# Patient Record
Sex: MALE | Race: WHITE | Employment: OTHER | URBAN - METROPOLITAN AREA
[De-identification: names, ages, dates, MRNs, and addresses within clinical notes are randomized per-mention and may not be internally consistent; named-entity substitution may affect disease eponyms.]

---

## 2017-01-17 DIAGNOSIS — D50.8 IRON DEFICIENCY ANEMIA SECONDARY TO INADEQUATE DIETARY IRON INTAKE: ICD-10-CM

## 2017-01-17 LAB
HCT VFR BLD CALC: 37.6 % (ref 40.5–52.5)
HEMOGLOBIN: 12.3 G/DL (ref 13.5–17.5)
MCH RBC QN AUTO: 27.7 PG (ref 26–34)
MCHC RBC AUTO-ENTMCNC: 32.6 G/DL (ref 31–36)
MCV RBC AUTO: 85 FL (ref 80–100)
PDW BLD-RTO: 16.9 % (ref 12.4–15.4)
PLATELET # BLD: 291 K/UL (ref 135–450)
PMV BLD AUTO: 8.9 FL (ref 5–10.5)
RBC # BLD: 4.42 M/UL (ref 4.2–5.9)
WBC # BLD: 7.3 K/UL (ref 4–11)

## 2017-01-25 ENCOUNTER — OFFICE VISIT (OUTPATIENT)
Dept: SURGERY | Age: 72
End: 2017-01-25

## 2017-01-25 VITALS
HEIGHT: 66 IN | WEIGHT: 212 LBS | SYSTOLIC BLOOD PRESSURE: 130 MMHG | BODY MASS INDEX: 34.07 KG/M2 | DIASTOLIC BLOOD PRESSURE: 60 MMHG

## 2017-01-25 DIAGNOSIS — K63.5 COLON POLYP: Primary | ICD-10-CM

## 2017-01-25 PROCEDURE — 99024 POSTOP FOLLOW-UP VISIT: CPT | Performed by: SURGERY

## 2017-01-31 ENCOUNTER — TELEPHONE (OUTPATIENT)
Dept: SURGERY | Age: 72
End: 2017-01-31

## 2017-01-31 ENCOUNTER — HOSPITAL ENCOUNTER (OUTPATIENT)
Dept: CT IMAGING | Age: 72
Discharge: HOME | End: 2017-01-31
Attending: SURGERY | Admitting: SURGERY

## 2017-01-31 DIAGNOSIS — K63.5 COLON POLYP: ICD-10-CM

## 2017-01-31 DIAGNOSIS — K63.5 POLYP OF COLON: ICD-10-CM

## 2017-02-15 ENCOUNTER — OFFICE VISIT (OUTPATIENT)
Dept: FAMILY MEDICINE CLINIC | Age: 72
End: 2017-02-15

## 2017-02-15 VITALS
HEART RATE: 81 BPM | BODY MASS INDEX: 32.87 KG/M2 | RESPIRATION RATE: 16 BRPM | HEIGHT: 66 IN | SYSTOLIC BLOOD PRESSURE: 123 MMHG | OXYGEN SATURATION: 98 % | WEIGHT: 204.5 LBS | TEMPERATURE: 98.2 F | DIASTOLIC BLOOD PRESSURE: 66 MMHG

## 2017-02-15 DIAGNOSIS — E11.22 CONTROLLED TYPE 2 DIABETES MELLITUS WITH STAGE 3 CHRONIC KIDNEY DISEASE, WITHOUT LONG-TERM CURRENT USE OF INSULIN (HCC): ICD-10-CM

## 2017-02-15 DIAGNOSIS — N18.30 CKD (CHRONIC KIDNEY DISEASE) STAGE 3, GFR 30-59 ML/MIN (HCC): ICD-10-CM

## 2017-02-15 DIAGNOSIS — K63.89 MESENTERIC MASS: ICD-10-CM

## 2017-02-15 DIAGNOSIS — E66.09 NON MORBID OBESITY DUE TO EXCESS CALORIES: ICD-10-CM

## 2017-02-15 DIAGNOSIS — R63.4 WEIGHT LOSS, UNINTENTIONAL: ICD-10-CM

## 2017-02-15 DIAGNOSIS — I10 ESSENTIAL HYPERTENSION: Primary | ICD-10-CM

## 2017-02-15 DIAGNOSIS — D50.8 IRON DEFICIENCY ANEMIA SECONDARY TO INADEQUATE DIETARY IRON INTAKE: ICD-10-CM

## 2017-02-15 DIAGNOSIS — N18.30 CONTROLLED TYPE 2 DIABETES MELLITUS WITH STAGE 3 CHRONIC KIDNEY DISEASE, WITHOUT LONG-TERM CURRENT USE OF INSULIN (HCC): ICD-10-CM

## 2017-02-15 DIAGNOSIS — J30.1 SEASONAL ALLERGIC RHINITIS DUE TO POLLEN: ICD-10-CM

## 2017-02-15 PROCEDURE — 99214 OFFICE O/P EST MOD 30 MIN: CPT | Performed by: FAMILY MEDICINE

## 2017-02-15 RX ORDER — M-VIT,TX,IRON,MINS/CALC/FOLIC 27MG-0.4MG
1 TABLET ORAL DAILY
COMMUNITY

## 2017-02-15 ASSESSMENT — ENCOUNTER SYMPTOMS
COLOR CHANGE: 0
NAUSEA: 0
BLOOD IN STOOL: 0
ANAL BLEEDING: 0
SHORTNESS OF BREATH: 0
CONSTIPATION: 0
VOMITING: 0
COUGH: 0
ABDOMINAL PAIN: 0
RECTAL PAIN: 0
STRIDOR: 0
CHEST TIGHTNESS: 0
APNEA: 0
DIARRHEA: 0
WHEEZING: 0
CHOKING: 0
ABDOMINAL DISTENTION: 0

## 2017-02-16 PROBLEM — R19.00 RETROPERITONEAL MASS: Status: ACTIVE | Noted: 2017-02-16

## 2017-02-23 ENCOUNTER — OFFICE VISIT (OUTPATIENT)
Dept: FAMILY MEDICINE CLINIC | Age: 72
End: 2017-02-23

## 2017-02-23 VITALS
OXYGEN SATURATION: 98 % | SYSTOLIC BLOOD PRESSURE: 114 MMHG | DIASTOLIC BLOOD PRESSURE: 67 MMHG | RESPIRATION RATE: 16 BRPM | HEIGHT: 66 IN | WEIGHT: 202 LBS | TEMPERATURE: 98.6 F | BODY MASS INDEX: 32.47 KG/M2 | HEART RATE: 86 BPM

## 2017-02-23 DIAGNOSIS — R19.00 RETROPERITONEAL MASS: ICD-10-CM

## 2017-02-23 DIAGNOSIS — M54.6 ACUTE RIGHT-SIDED THORACIC BACK PAIN: ICD-10-CM

## 2017-02-23 DIAGNOSIS — M54.50 ACUTE RIGHT-SIDED LOW BACK PAIN WITHOUT SCIATICA: Primary | ICD-10-CM

## 2017-02-23 PROCEDURE — 99214 OFFICE O/P EST MOD 30 MIN: CPT | Performed by: FAMILY MEDICINE

## 2017-02-23 RX ORDER — CYCLOBENZAPRINE HCL 5 MG
5 TABLET ORAL EVERY 8 HOURS PRN
Qty: 21 TABLET | Refills: 1 | Status: SHIPPED | OUTPATIENT
Start: 2017-02-23 | End: 2017-09-08 | Stop reason: ALTCHOICE

## 2017-02-23 RX ORDER — NAPROXEN 500 MG/1
500 TABLET ORAL 2 TIMES DAILY WITH MEALS
Qty: 30 TABLET | Refills: 0 | Status: SHIPPED | OUTPATIENT
Start: 2017-02-23 | End: 2017-02-23 | Stop reason: CLARIF

## 2017-02-23 RX ORDER — HYDROCODONE BITARTRATE AND ACETAMINOPHEN 5; 325 MG/1; MG/1
1 TABLET ORAL EVERY 8 HOURS PRN
Qty: 15 TABLET | Refills: 0 | Status: ON HOLD | OUTPATIENT
Start: 2017-02-23 | End: 2017-10-12

## 2017-02-23 ASSESSMENT — ENCOUNTER SYMPTOMS
ANAL BLEEDING: 0
COUGH: 0
NAUSEA: 0
BACK PAIN: 1
APNEA: 0
CONSTIPATION: 0
ABDOMINAL DISTENTION: 0
WHEEZING: 0
CHOKING: 0
RECTAL PAIN: 0
BLOOD IN STOOL: 0
SHORTNESS OF BREATH: 0
ABDOMINAL PAIN: 0
VOMITING: 0
CHEST TIGHTNESS: 0
DIARRHEA: 0
STRIDOR: 0

## 2017-02-27 ENCOUNTER — HOSPITAL ENCOUNTER (OUTPATIENT)
Dept: CT IMAGING | Age: 72
Discharge: HOME | End: 2017-02-27
Attending: INTERNAL MEDICINE | Admitting: INTERNAL MEDICINE

## 2017-02-27 VITALS
WEIGHT: 202 LBS | HEART RATE: 62 BPM | SYSTOLIC BLOOD PRESSURE: 121 MMHG | BODY MASS INDEX: 32.47 KG/M2 | HEIGHT: 66 IN | TEMPERATURE: 97 F | OXYGEN SATURATION: 99 % | DIASTOLIC BLOOD PRESSURE: 73 MMHG | RESPIRATION RATE: 16 BRPM

## 2017-02-27 DIAGNOSIS — R19.00 RETROPERITONEAL MASS: ICD-10-CM

## 2017-02-27 DIAGNOSIS — R19.00 INTRA-ABDOMINAL AND PELVIC SWELLING, MASS AND LUMP, UNSPECIFIED SITE: ICD-10-CM

## 2017-02-27 LAB
APTT: 28.5 SEC (ref 21–31.8)
INR BLD: 1 (ref 0.85–1.15)
PLATELET # BLD: 201 K/UL (ref 135–450)
PROTHROMBIN TIME: 11.3 SEC (ref 9.6–13)

## 2017-02-27 RX ORDER — OXYCODONE HYDROCHLORIDE AND ACETAMINOPHEN 5; 325 MG/1; MG/1
1 TABLET ORAL EVERY 4 HOURS PRN
Status: DISCONTINUED | OUTPATIENT
Start: 2017-02-27 | End: 2017-02-28 | Stop reason: HOSPADM

## 2017-02-27 RX ORDER — FENTANYL CITRATE 50 UG/ML
50 INJECTION, SOLUTION INTRAMUSCULAR; INTRAVENOUS ONCE
Status: COMPLETED | OUTPATIENT
Start: 2017-02-27 | End: 2017-02-27

## 2017-02-27 RX ADMIN — FENTANYL CITRATE 50 MCG: 50 INJECTION, SOLUTION INTRAMUSCULAR; INTRAVENOUS at 09:53

## 2017-02-27 ASSESSMENT — PAIN SCALES - GENERAL
PAINLEVEL_OUTOF10: 0

## 2017-03-07 PROBLEM — C85.83 LARGE CELL LYMPHOMA OF INTRA-ABDOMINAL LYMPH NODES (HCC): Status: ACTIVE | Noted: 2017-03-07

## 2017-03-07 PROBLEM — C85.10 B-CELL LYMPHOMA (HCC): Status: ACTIVE | Noted: 2017-03-07

## 2017-03-13 ENCOUNTER — TELEPHONE (OUTPATIENT)
Dept: SURGERY | Age: 72
End: 2017-03-13

## 2017-03-15 ENCOUNTER — OFFICE VISIT (OUTPATIENT)
Dept: FAMILY MEDICINE CLINIC | Age: 72
End: 2017-03-15

## 2017-03-15 VITALS
RESPIRATION RATE: 16 BRPM | TEMPERATURE: 97.8 F | OXYGEN SATURATION: 98 % | HEART RATE: 84 BPM | HEIGHT: 66 IN | SYSTOLIC BLOOD PRESSURE: 120 MMHG | BODY MASS INDEX: 32.11 KG/M2 | DIASTOLIC BLOOD PRESSURE: 70 MMHG | WEIGHT: 199.8 LBS

## 2017-03-15 DIAGNOSIS — I10 ESSENTIAL HYPERTENSION: Primary | ICD-10-CM

## 2017-03-15 DIAGNOSIS — R19.00 RETROPERITONEAL MASS: ICD-10-CM

## 2017-03-15 DIAGNOSIS — M54.6 ACUTE RIGHT-SIDED THORACIC BACK PAIN: ICD-10-CM

## 2017-03-15 DIAGNOSIS — C85.83 LARGE CELL LYMPHOMA OF INTRA-ABDOMINAL LYMPH NODES (HCC): ICD-10-CM

## 2017-03-15 DIAGNOSIS — N18.30 TYPE 2 DIABETES MELLITUS WITH STAGE 3 CHRONIC KIDNEY DISEASE, WITHOUT LONG-TERM CURRENT USE OF INSULIN (HCC): ICD-10-CM

## 2017-03-15 DIAGNOSIS — E78.5 HYPERLIPIDEMIA WITH TARGET LDL LESS THAN 100: ICD-10-CM

## 2017-03-15 DIAGNOSIS — M54.50 ACUTE RIGHT-SIDED LOW BACK PAIN WITHOUT SCIATICA: ICD-10-CM

## 2017-03-15 DIAGNOSIS — E11.22 TYPE 2 DIABETES MELLITUS WITH STAGE 3 CHRONIC KIDNEY DISEASE, WITHOUT LONG-TERM CURRENT USE OF INSULIN (HCC): ICD-10-CM

## 2017-03-15 DIAGNOSIS — D50.8 IRON DEFICIENCY ANEMIA SECONDARY TO INADEQUATE DIETARY IRON INTAKE: ICD-10-CM

## 2017-03-15 PROCEDURE — 99214 OFFICE O/P EST MOD 30 MIN: CPT | Performed by: FAMILY MEDICINE

## 2017-03-15 RX ORDER — METFORMIN HYDROCHLORIDE 500 MG/1
TABLET, EXTENDED RELEASE ORAL
Qty: 90 TABLET | Refills: 0 | Status: SHIPPED | OUTPATIENT
Start: 2017-03-15 | End: 2017-05-10 | Stop reason: CLARIF

## 2017-03-15 RX ORDER — SIMVASTATIN 40 MG
TABLET ORAL
Qty: 90 TABLET | Refills: 0 | Status: SHIPPED | OUTPATIENT
Start: 2017-03-15 | End: 2017-07-10 | Stop reason: SDUPTHER

## 2017-03-15 RX ORDER — LANOLIN ALCOHOL/MO/W.PET/CERES
325 CREAM (GRAM) TOPICAL
Qty: 90 TABLET | Refills: 1 | Status: SHIPPED | OUTPATIENT
Start: 2017-03-15 | End: 2017-05-10 | Stop reason: CLARIF

## 2017-03-15 ASSESSMENT — ENCOUNTER SYMPTOMS
BACK PAIN: 0
BLOOD IN STOOL: 0
APNEA: 0
VOMITING: 0
CHEST TIGHTNESS: 0
RECTAL PAIN: 0
ABDOMINAL DISTENTION: 0
WHEEZING: 0
STRIDOR: 0
NAUSEA: 0
DIARRHEA: 0
ABDOMINAL PAIN: 0
COUGH: 0
CONSTIPATION: 0
SHORTNESS OF BREATH: 0
ANAL BLEEDING: 0
CHOKING: 0

## 2017-03-20 ENCOUNTER — HOSPITAL ENCOUNTER (OUTPATIENT)
Dept: NON INVASIVE DIAGNOSTICS | Age: 72
Discharge: HOME | End: 2017-03-20
Attending: INTERNAL MEDICINE | Admitting: INTERNAL MEDICINE

## 2017-03-20 DIAGNOSIS — Z01.818 ENCOUNTER FOR OTHER PREPROCEDURAL EXAMINATION: ICD-10-CM

## 2017-03-20 LAB
LV EF: 58 %
LVEF MODALITY: NORMAL

## 2017-03-22 ENCOUNTER — TELEPHONE (OUTPATIENT)
Dept: FAMILY MEDICINE CLINIC | Age: 72
End: 2017-03-22

## 2017-03-22 DIAGNOSIS — I10 ESSENTIAL HYPERTENSION: ICD-10-CM

## 2017-03-22 RX ORDER — AMLODIPINE BESYLATE 5 MG/1
TABLET ORAL
Qty: 90 TABLET | Refills: 0 | Status: ON HOLD | OUTPATIENT
Start: 2017-03-22 | End: 2017-04-02 | Stop reason: HOSPADM

## 2017-03-22 RX ORDER — LISINOPRIL 20 MG/1
TABLET ORAL
Qty: 90 TABLET | Refills: 0 | Status: ON HOLD | OUTPATIENT
Start: 2017-03-22 | End: 2017-04-02

## 2017-03-24 ENCOUNTER — OFFICE VISIT (OUTPATIENT)
Dept: SURGERY | Age: 72
End: 2017-03-24

## 2017-03-24 VITALS
HEIGHT: 66 IN | SYSTOLIC BLOOD PRESSURE: 111 MMHG | OXYGEN SATURATION: 97 % | TEMPERATURE: 98.2 F | BODY MASS INDEX: 31.5 KG/M2 | WEIGHT: 196 LBS | HEART RATE: 88 BPM | DIASTOLIC BLOOD PRESSURE: 68 MMHG

## 2017-03-24 DIAGNOSIS — C85.83 LARGE CELL LYMPHOMA OF INTRA-ABDOMINAL LYMPH NODES (HCC): ICD-10-CM

## 2017-03-24 DIAGNOSIS — K63.89 MESENTERIC MASS: Primary | ICD-10-CM

## 2017-03-24 PROCEDURE — 99203 OFFICE O/P NEW LOW 30 MIN: CPT | Performed by: SURGERY

## 2017-03-27 ENCOUNTER — HOSPITAL ENCOUNTER (OUTPATIENT)
Dept: CARDIAC CATH/INVASIVE PROCEDURES | Age: 72
Discharge: HOME | End: 2017-03-27
Attending: INTERNAL MEDICINE | Admitting: INTERNAL MEDICINE

## 2017-03-27 VITALS — WEIGHT: 197 LBS | TEMPERATURE: 98.2 F | HEIGHT: 64 IN | BODY MASS INDEX: 33.63 KG/M2

## 2017-03-27 DIAGNOSIS — C81.00 NODULAR LYMPHOCYTE PREDOMINANT HODGKIN LYMPHOMA, UNSPECIFIED BODY REGION (HCC): ICD-10-CM

## 2017-03-27 LAB
HCT VFR BLD CALC: 35.5 % (ref 40.5–52.5)
HEMOGLOBIN: 11.8 G/DL (ref 13.5–17.5)
INR BLD: 1 (ref 0.85–1.15)
MCH RBC QN AUTO: 27.3 PG (ref 26–34)
MCHC RBC AUTO-ENTMCNC: 33.3 G/DL (ref 31–36)
MCV RBC AUTO: 82 FL (ref 80–100)
PDW BLD-RTO: 16.5 % (ref 12.4–15.4)
PLATELET # BLD: 259 K/UL (ref 135–450)
PMV BLD AUTO: 8.4 FL (ref 5–10.5)
PROTHROMBIN TIME: 11.3 SEC (ref 9.6–13)
RBC # BLD: 4.33 M/UL (ref 4.2–5.9)
WBC # BLD: 10 K/UL (ref 4–11)

## 2017-03-28 ENCOUNTER — TELEPHONE (OUTPATIENT)
Dept: SURGERY | Age: 72
End: 2017-03-28

## 2017-03-28 PROBLEM — Z51.11 CHEMOTHERAPY MANAGEMENT, ENCOUNTER FOR: Status: ACTIVE | Noted: 2017-03-28

## 2017-03-28 PROBLEM — Z71.2 ENCOUNTER TO DISCUSS TEST RESULTS: Status: ACTIVE | Noted: 2017-03-28

## 2017-03-31 PROBLEM — E86.0 DEHYDRATION: Status: ACTIVE | Noted: 2017-03-31

## 2017-04-03 ENCOUNTER — TELEPHONE (OUTPATIENT)
Dept: FAMILY MEDICINE CLINIC | Age: 72
End: 2017-04-03

## 2017-04-04 ENCOUNTER — CARE COORDINATION (OUTPATIENT)
Dept: CASE MANAGEMENT | Age: 72
End: 2017-04-04

## 2017-04-07 PROBLEM — Z51.89: Status: ACTIVE | Noted: 2017-04-07

## 2017-04-07 PROBLEM — Z29.9 NEED FOR PROPHYLACTIC MEASURE: Status: ACTIVE | Noted: 2017-04-07

## 2017-04-17 ENCOUNTER — OFFICE VISIT (OUTPATIENT)
Dept: FAMILY MEDICINE CLINIC | Age: 72
End: 2017-04-17

## 2017-04-17 VITALS
SYSTOLIC BLOOD PRESSURE: 116 MMHG | TEMPERATURE: 98.5 F | DIASTOLIC BLOOD PRESSURE: 70 MMHG | OXYGEN SATURATION: 98 % | HEART RATE: 81 BPM | RESPIRATION RATE: 16 BRPM | HEIGHT: 66 IN | BODY MASS INDEX: 30.78 KG/M2 | WEIGHT: 191.5 LBS

## 2017-04-17 DIAGNOSIS — E78.5 HYPERLIPIDEMIA WITH TARGET LDL LESS THAN 100: ICD-10-CM

## 2017-04-17 DIAGNOSIS — D50.8 ANEMIA, IRON DEFICIENCY, INADEQUATE DIETARY INTAKE: ICD-10-CM

## 2017-04-17 DIAGNOSIS — E66.09 NON MORBID OBESITY DUE TO EXCESS CALORIES: ICD-10-CM

## 2017-04-17 DIAGNOSIS — I10 ESSENTIAL HYPERTENSION: ICD-10-CM

## 2017-04-17 DIAGNOSIS — N18.30 CKD (CHRONIC KIDNEY DISEASE) STAGE 3, GFR 30-59 ML/MIN (HCC): ICD-10-CM

## 2017-04-17 DIAGNOSIS — E11.22 CONTROLLED TYPE 2 DIABETES MELLITUS WITH STAGE 3 CHRONIC KIDNEY DISEASE, WITHOUT LONG-TERM CURRENT USE OF INSULIN (HCC): Primary | ICD-10-CM

## 2017-04-17 DIAGNOSIS — C85.83 LARGE CELL LYMPHOMA OF INTRA-ABDOMINAL LYMPH NODES (HCC): ICD-10-CM

## 2017-04-17 DIAGNOSIS — N18.30 CONTROLLED TYPE 2 DIABETES MELLITUS WITH STAGE 3 CHRONIC KIDNEY DISEASE, WITHOUT LONG-TERM CURRENT USE OF INSULIN (HCC): Primary | ICD-10-CM

## 2017-04-17 PROCEDURE — 99214 OFFICE O/P EST MOD 30 MIN: CPT | Performed by: FAMILY MEDICINE

## 2017-04-17 ASSESSMENT — ENCOUNTER SYMPTOMS
COUGH: 0
CONSTIPATION: 0
CHEST TIGHTNESS: 0
STRIDOR: 0
NAUSEA: 0
ABDOMINAL PAIN: 0
ABDOMINAL DISTENTION: 0
BLOOD IN STOOL: 0
VOMITING: 0
BACK PAIN: 0
DIARRHEA: 0
WHEEZING: 0
ANAL BLEEDING: 0
CHOKING: 0
SHORTNESS OF BREATH: 0
APNEA: 0
RECTAL PAIN: 0

## 2017-04-29 DIAGNOSIS — E11.22 CONTROLLED TYPE 2 DIABETES MELLITUS WITH STAGE 3 CHRONIC KIDNEY DISEASE, WITHOUT LONG-TERM CURRENT USE OF INSULIN (HCC): ICD-10-CM

## 2017-04-29 DIAGNOSIS — D50.8 IRON DEFICIENCY ANEMIA SECONDARY TO INADEQUATE DIETARY IRON INTAKE: ICD-10-CM

## 2017-04-29 DIAGNOSIS — E55.9 VITAMIN D DEFICIENCY: ICD-10-CM

## 2017-04-29 DIAGNOSIS — I10 ESSENTIAL HYPERTENSION, BENIGN: ICD-10-CM

## 2017-04-29 DIAGNOSIS — N18.30 CHRONIC KIDNEY DISEASE, STAGE III (MODERATE) (HCC): ICD-10-CM

## 2017-04-29 DIAGNOSIS — E78.5 HYPERLIPIDEMIA WITH TARGET LDL LESS THAN 100: ICD-10-CM

## 2017-04-29 DIAGNOSIS — N18.30 CONTROLLED TYPE 2 DIABETES MELLITUS WITH STAGE 3 CHRONIC KIDNEY DISEASE, WITHOUT LONG-TERM CURRENT USE OF INSULIN (HCC): ICD-10-CM

## 2017-04-29 LAB
A/G RATIO: 2 (ref 1.1–2.2)
ALBUMIN SERPL-MCNC: 3.9 G/DL (ref 3.4–5)
ALP BLD-CCNC: 63 U/L (ref 40–129)
ALT SERPL-CCNC: 13 U/L (ref 10–40)
ANION GAP SERPL CALCULATED.3IONS-SCNC: 14 MMOL/L (ref 3–16)
AST SERPL-CCNC: 15 U/L (ref 15–37)
BILIRUB SERPL-MCNC: 0.3 MG/DL (ref 0–1)
BUN BLDV-MCNC: 19 MG/DL (ref 7–20)
CALCIUM SERPL-MCNC: 9.3 MG/DL (ref 8.3–10.6)
CHLORIDE BLD-SCNC: 90 MMOL/L (ref 99–110)
CHOLESTEROL, TOTAL: 119 MG/DL (ref 0–199)
CO2: 34 MMOL/L (ref 21–32)
CREAT SERPL-MCNC: 1.5 MG/DL (ref 0.8–1.3)
CREATININE URINE: 523 MG/DL (ref 39–259)
GFR AFRICAN AMERICAN: 56
GFR NON-AFRICAN AMERICAN: 46
GLOBULIN: 2 G/DL
GLUCOSE BLD-MCNC: 99 MG/DL (ref 70–99)
HCT VFR BLD CALC: 32.1 % (ref 40.5–52.5)
HDLC SERPL-MCNC: 43 MG/DL (ref 40–60)
HEMOGLOBIN: 10.5 G/DL (ref 13.5–17.5)
LDL CHOLESTEROL CALCULATED: 41 MG/DL
MCH RBC QN AUTO: 26.7 PG (ref 26–34)
MCHC RBC AUTO-ENTMCNC: 32.6 G/DL (ref 31–36)
MCV RBC AUTO: 82 FL (ref 80–100)
MICROALBUMIN UR-MCNC: 2 MG/DL
MICROALBUMIN/CREAT UR-RTO: 3.8 MG/G (ref 0–30)
PDW BLD-RTO: 17.4 % (ref 12.4–15.4)
PLATELET # BLD: 533 K/UL (ref 135–450)
PMV BLD AUTO: 7.5 FL (ref 5–10.5)
POTASSIUM SERPL-SCNC: 4.4 MMOL/L (ref 3.5–5.1)
RBC # BLD: 3.92 M/UL (ref 4.2–5.9)
SODIUM BLD-SCNC: 138 MMOL/L (ref 136–145)
TOTAL PROTEIN: 5.9 G/DL (ref 6.4–8.2)
TRIGL SERPL-MCNC: 173 MG/DL (ref 0–150)
VITAMIN D 25-HYDROXY: 49.2 NG/ML
VLDLC SERPL CALC-MCNC: 35 MG/DL
WBC # BLD: 8.7 K/UL (ref 4–11)

## 2017-04-30 LAB
ESTIMATED AVERAGE GLUCOSE: 137 MG/DL
HBA1C MFR BLD: 6.4 %

## 2017-05-02 PROBLEM — R64 CACHEXIA (HCC): Status: ACTIVE | Noted: 2017-05-02

## 2017-05-15 ENCOUNTER — TELEPHONE (OUTPATIENT)
Dept: PHARMACY | Facility: CLINIC | Age: 72
End: 2017-05-15

## 2017-05-22 DIAGNOSIS — E11.9 TYPE 2 DIABETES MELLITUS WITHOUT COMPLICATION (HCC): ICD-10-CM

## 2017-05-22 RX ORDER — LANCETS
EACH MISCELLANEOUS
Qty: 204 EACH | Refills: 3 | Status: SHIPPED | OUTPATIENT
Start: 2017-05-22 | End: 2018-03-19 | Stop reason: CLARIF

## 2017-05-22 RX ORDER — ISOPROPYL ALCOHOL 0.75 G/1
SWAB TOPICAL
Qty: 100 EACH | Refills: 3 | Status: SHIPPED | OUTPATIENT
Start: 2017-05-22 | End: 2018-03-19 | Stop reason: CLARIF

## 2017-05-22 RX ORDER — BLOOD SUGAR DIAGNOSTIC
STRIP MISCELLANEOUS
Qty: 200 STRIP | Refills: 3 | Status: SHIPPED | OUTPATIENT
Start: 2017-05-22 | End: 2018-03-19 | Stop reason: CLARIF

## 2017-06-15 ENCOUNTER — OFFICE VISIT (OUTPATIENT)
Dept: FAMILY MEDICINE CLINIC | Age: 72
End: 2017-06-15

## 2017-06-15 VITALS
SYSTOLIC BLOOD PRESSURE: 111 MMHG | RESPIRATION RATE: 16 BRPM | BODY MASS INDEX: 26.53 KG/M2 | TEMPERATURE: 98.5 F | OXYGEN SATURATION: 98 % | WEIGHT: 165.1 LBS | DIASTOLIC BLOOD PRESSURE: 64 MMHG | HEART RATE: 81 BPM | HEIGHT: 66 IN

## 2017-06-15 DIAGNOSIS — E11.22 CONTROLLED TYPE 2 DIABETES MELLITUS WITH STAGE 3 CHRONIC KIDNEY DISEASE, WITHOUT LONG-TERM CURRENT USE OF INSULIN (HCC): Primary | ICD-10-CM

## 2017-06-15 DIAGNOSIS — N18.30 CKD (CHRONIC KIDNEY DISEASE) STAGE 3, GFR 30-59 ML/MIN (HCC): ICD-10-CM

## 2017-06-15 DIAGNOSIS — I10 ESSENTIAL HYPERTENSION, BENIGN: ICD-10-CM

## 2017-06-15 DIAGNOSIS — D50.8 ANEMIA, IRON DEFICIENCY, INADEQUATE DIETARY INTAKE: ICD-10-CM

## 2017-06-15 DIAGNOSIS — E78.5 HYPERLIPIDEMIA WITH TARGET LDL LESS THAN 100: ICD-10-CM

## 2017-06-15 DIAGNOSIS — C85.83 LARGE CELL LYMPHOMA OF INTRA-ABDOMINAL LYMPH NODES (HCC): ICD-10-CM

## 2017-06-15 DIAGNOSIS — J30.1 SEASONAL ALLERGIC RHINITIS DUE TO POLLEN: ICD-10-CM

## 2017-06-15 DIAGNOSIS — N18.30 CONTROLLED TYPE 2 DIABETES MELLITUS WITH STAGE 3 CHRONIC KIDNEY DISEASE, WITHOUT LONG-TERM CURRENT USE OF INSULIN (HCC): Primary | ICD-10-CM

## 2017-06-15 PROCEDURE — 3288F FALL RISK ASSESSMENT DOCD: CPT | Performed by: FAMILY MEDICINE

## 2017-06-15 PROCEDURE — 99214 OFFICE O/P EST MOD 30 MIN: CPT | Performed by: FAMILY MEDICINE

## 2017-06-15 ASSESSMENT — ENCOUNTER SYMPTOMS
CONSTIPATION: 0
SHORTNESS OF BREATH: 0
ANAL BLEEDING: 0
VOMITING: 0
CHEST TIGHTNESS: 0
DIARRHEA: 0
APNEA: 0
COLOR CHANGE: 0
WHEEZING: 0
STRIDOR: 0
RECTAL PAIN: 0
BLOOD IN STOOL: 0
NAUSEA: 0
ABDOMINAL DISTENTION: 0
ABDOMINAL PAIN: 0
CHOKING: 0
COUGH: 0
BACK PAIN: 0

## 2017-06-21 PROBLEM — I95.2 HYPOTENSION DUE TO DRUGS: Status: ACTIVE | Noted: 2017-06-21

## 2017-06-21 PROBLEM — T45.1X5A CHEMOTHERAPY INDUCED NAUSEA AND VOMITING: Status: ACTIVE | Noted: 2017-06-21

## 2017-06-21 PROBLEM — R11.2 CHEMOTHERAPY INDUCED NAUSEA AND VOMITING: Status: ACTIVE | Noted: 2017-06-21

## 2017-07-07 ENCOUNTER — HOSPITAL ENCOUNTER (OUTPATIENT)
Dept: ONCOLOGY | Age: 72
Discharge: HOME | End: 2017-07-31
Attending: INTERNAL MEDICINE | Admitting: INTERNAL MEDICINE

## 2017-07-07 VITALS
HEART RATE: 61 BPM | TEMPERATURE: 97 F | DIASTOLIC BLOOD PRESSURE: 69 MMHG | RESPIRATION RATE: 16 BRPM | SYSTOLIC BLOOD PRESSURE: 106 MMHG

## 2017-07-07 LAB
ABO/RH: NORMAL
ANTIBODY SCREEN: NORMAL
BLOOD BANK DISPENSE STATUS: NORMAL
BLOOD BANK PRODUCT CODE: NORMAL
BPU ID: NORMAL
DESCRIPTION BLOOD BANK: NORMAL

## 2017-07-07 RX ORDER — HEPARIN SODIUM (PORCINE) LOCK FLUSH IV SOLN 100 UNIT/ML 100 UNIT/ML
500 SOLUTION INTRAVENOUS ONCE
Status: COMPLETED | OUTPATIENT
Start: 2017-07-07 | End: 2017-07-07

## 2017-07-07 RX ORDER — 0.9 % SODIUM CHLORIDE 0.9 %
250 INTRAVENOUS SOLUTION INTRAVENOUS ONCE
Status: COMPLETED | OUTPATIENT
Start: 2017-07-07 | End: 2017-07-07

## 2017-07-07 RX ORDER — DIPHENHYDRAMINE HCL 25 MG
50 TABLET ORAL ONCE
Status: COMPLETED | OUTPATIENT
Start: 2017-07-07 | End: 2017-07-07

## 2017-07-07 RX ORDER — ACETAMINOPHEN 325 MG/1
650 TABLET ORAL ONCE
Status: COMPLETED | OUTPATIENT
Start: 2017-07-07 | End: 2017-07-07

## 2017-07-07 RX ADMIN — HEPARIN SODIUM (PORCINE) LOCK FLUSH IV SOLN 100 UNIT/ML 500 UNITS: 100 SOLUTION at 14:39

## 2017-07-07 RX ADMIN — Medication 250 ML: at 13:10

## 2017-07-07 RX ADMIN — Medication 50 MG: at 12:40

## 2017-07-07 RX ADMIN — ACETAMINOPHEN 650 MG: 325 TABLET ORAL at 12:40

## 2017-07-10 ENCOUNTER — TELEPHONE (OUTPATIENT)
Dept: FAMILY MEDICINE CLINIC | Age: 72
End: 2017-07-10

## 2017-07-10 DIAGNOSIS — E78.5 HYPERLIPIDEMIA WITH TARGET LDL LESS THAN 100: ICD-10-CM

## 2017-07-10 RX ORDER — SIMVASTATIN 40 MG
TABLET ORAL
Qty: 90 TABLET | Refills: 0 | Status: SHIPPED | OUTPATIENT
Start: 2017-07-10 | End: 2017-09-11 | Stop reason: SDUPTHER

## 2017-07-11 PROBLEM — F19.982 DRUG INDUCED INSOMNIA (HCC): Status: ACTIVE | Noted: 2017-07-11

## 2017-07-11 PROBLEM — R53.0 NEOPLASTIC MALIGNANT RELATED FATIGUE: Status: ACTIVE | Noted: 2017-07-11

## 2017-07-17 PROBLEM — D61.818 PANCYTOPENIA (HCC): Status: ACTIVE | Noted: 2017-07-17

## 2017-09-08 ENCOUNTER — HOSPITAL ENCOUNTER (OUTPATIENT)
Dept: ENDOSCOPY | Age: 72
Discharge: HOME | End: 2017-09-08
Attending: INTERNAL MEDICINE | Admitting: INTERNAL MEDICINE

## 2017-09-08 VITALS
RESPIRATION RATE: 16 BRPM | WEIGHT: 150 LBS | BODY MASS INDEX: 24.11 KG/M2 | HEART RATE: 54 BPM | DIASTOLIC BLOOD PRESSURE: 63 MMHG | HEIGHT: 66 IN | OXYGEN SATURATION: 99 % | SYSTOLIC BLOOD PRESSURE: 113 MMHG | TEMPERATURE: 97.4 F

## 2017-09-08 LAB
GLUCOSE BLD-MCNC: 86 MG/DL (ref 70–99)
GLUCOSE BLD-MCNC: 87 MG/DL (ref 70–99)
PERFORMED ON: NORMAL
PERFORMED ON: NORMAL

## 2017-09-08 RX ORDER — HEPARIN SODIUM (PORCINE) LOCK FLUSH IV SOLN 100 UNIT/ML 100 UNIT/ML
500 SOLUTION INTRAVENOUS PRN
Status: DISCONTINUED | OUTPATIENT
Start: 2017-09-08 | End: 2017-09-09 | Stop reason: HOSPADM

## 2017-09-08 RX ADMIN — HEPARIN SODIUM (PORCINE) LOCK FLUSH IV SOLN 100 UNIT/ML 300 UNITS: 100 SOLUTION at 12:22

## 2017-09-08 ASSESSMENT — PAIN - FUNCTIONAL ASSESSMENT: PAIN_FUNCTIONAL_ASSESSMENT: 0-10

## 2017-09-08 ASSESSMENT — PAIN SCALES - GENERAL
PAINLEVEL_OUTOF10: 0
PAINLEVEL_OUTOF10: 0

## 2017-09-11 ENCOUNTER — OFFICE VISIT (OUTPATIENT)
Dept: FAMILY MEDICINE CLINIC | Age: 72
End: 2017-09-11

## 2017-09-11 VITALS
HEIGHT: 66 IN | DIASTOLIC BLOOD PRESSURE: 68 MMHG | TEMPERATURE: 97.7 F | BODY MASS INDEX: 22.87 KG/M2 | RESPIRATION RATE: 16 BRPM | WEIGHT: 142.3 LBS | HEART RATE: 79 BPM | OXYGEN SATURATION: 97 % | SYSTOLIC BLOOD PRESSURE: 105 MMHG

## 2017-09-11 DIAGNOSIS — I10 ESSENTIAL HYPERTENSION, BENIGN: ICD-10-CM

## 2017-09-11 DIAGNOSIS — I10 ESSENTIAL HYPERTENSION: Primary | ICD-10-CM

## 2017-09-11 DIAGNOSIS — R91.1 LUNG NODULE: ICD-10-CM

## 2017-09-11 DIAGNOSIS — Z12.5 SCREENING PSA (PROSTATE SPECIFIC ANTIGEN): ICD-10-CM

## 2017-09-11 DIAGNOSIS — Z23 NEED FOR INFLUENZA VACCINATION: ICD-10-CM

## 2017-09-11 DIAGNOSIS — N18.30 CONTROLLED TYPE 2 DIABETES MELLITUS WITH STAGE 3 CHRONIC KIDNEY DISEASE, WITHOUT LONG-TERM CURRENT USE OF INSULIN (HCC): ICD-10-CM

## 2017-09-11 DIAGNOSIS — N18.30 CKD (CHRONIC KIDNEY DISEASE) STAGE 3, GFR 30-59 ML/MIN (HCC): ICD-10-CM

## 2017-09-11 DIAGNOSIS — E11.22 CONTROLLED TYPE 2 DIABETES MELLITUS WITH STAGE 3 CHRONIC KIDNEY DISEASE, WITHOUT LONG-TERM CURRENT USE OF INSULIN (HCC): ICD-10-CM

## 2017-09-11 DIAGNOSIS — J30.1 SEASONAL ALLERGIC RHINITIS DUE TO POLLEN: ICD-10-CM

## 2017-09-11 DIAGNOSIS — E78.5 HYPERLIPIDEMIA WITH TARGET LDL LESS THAN 100: ICD-10-CM

## 2017-09-11 DIAGNOSIS — D61.818 PANCYTOPENIA (HCC): ICD-10-CM

## 2017-09-11 DIAGNOSIS — Z12.11 COLON CANCER SCREENING: ICD-10-CM

## 2017-09-11 DIAGNOSIS — N18.3 CKD (CHRONIC KIDNEY DISEASE), STAGE 3 (MODERATE): ICD-10-CM

## 2017-09-11 DIAGNOSIS — C85.83 LARGE CELL LYMPHOMA OF INTRA-ABDOMINAL LYMPH NODES (HCC): ICD-10-CM

## 2017-09-11 LAB
A/G RATIO: 2.4 (ref 1.1–2.2)
ALBUMIN SERPL-MCNC: 4 G/DL (ref 3.4–5)
ALP BLD-CCNC: 67 U/L (ref 40–129)
ALT SERPL-CCNC: 22 U/L (ref 10–40)
ANION GAP SERPL CALCULATED.3IONS-SCNC: 12 MMOL/L (ref 3–16)
AST SERPL-CCNC: 32 U/L (ref 15–37)
BILIRUB SERPL-MCNC: 0.4 MG/DL (ref 0–1)
BUN BLDV-MCNC: 19 MG/DL (ref 7–20)
CALCIUM SERPL-MCNC: 9.4 MG/DL (ref 8.3–10.6)
CHLORIDE BLD-SCNC: 94 MMOL/L (ref 99–110)
CHOLESTEROL, TOTAL: 136 MG/DL (ref 0–199)
CO2: 32 MMOL/L (ref 21–32)
CREAT SERPL-MCNC: 1.2 MG/DL (ref 0.8–1.3)
GFR AFRICAN AMERICAN: >60
GFR NON-AFRICAN AMERICAN: 59
GLOBULIN: 1.7 G/DL
GLUCOSE BLD-MCNC: 95 MG/DL (ref 70–99)
HDLC SERPL-MCNC: 72 MG/DL (ref 40–60)
LDL CHOLESTEROL CALCULATED: 40 MG/DL
POTASSIUM SERPL-SCNC: 3.9 MMOL/L (ref 3.5–5.1)
PROSTATE SPECIFIC ANTIGEN: 1.37 NG/ML (ref 0–4)
SODIUM BLD-SCNC: 138 MMOL/L (ref 136–145)
TOTAL PROTEIN: 5.7 G/DL (ref 6.4–8.2)
TRIGL SERPL-MCNC: 119 MG/DL (ref 0–150)
VLDLC SERPL CALC-MCNC: 24 MG/DL

## 2017-09-11 PROCEDURE — G0008 ADMIN INFLUENZA VIRUS VAC: HCPCS | Performed by: FAMILY MEDICINE

## 2017-09-11 PROCEDURE — 90662 IIV NO PRSV INCREASED AG IM: CPT | Performed by: FAMILY MEDICINE

## 2017-09-11 PROCEDURE — 99214 OFFICE O/P EST MOD 30 MIN: CPT | Performed by: FAMILY MEDICINE

## 2017-09-11 RX ORDER — SIMVASTATIN 40 MG
TABLET ORAL
Qty: 90 TABLET | Refills: 0 | Status: SHIPPED | OUTPATIENT
Start: 2017-09-11 | End: 2017-12-05 | Stop reason: SDUPTHER

## 2017-09-11 ASSESSMENT — ENCOUNTER SYMPTOMS
COUGH: 0
RECTAL PAIN: 0
WHEEZING: 0
STRIDOR: 0
DIARRHEA: 0
VOMITING: 0
CHOKING: 0
BLOOD IN STOOL: 0
ABDOMINAL DISTENTION: 0
ABDOMINAL PAIN: 0
ANAL BLEEDING: 0
BACK PAIN: 0
SHORTNESS OF BREATH: 0
CHEST TIGHTNESS: 0
CONSTIPATION: 0
APNEA: 0
NAUSEA: 0

## 2017-09-12 ENCOUNTER — HOSPITAL ENCOUNTER (OUTPATIENT)
Dept: CT IMAGING | Age: 72
Discharge: HOME | End: 2017-09-12
Attending: INTERNAL MEDICINE | Admitting: INTERNAL MEDICINE

## 2017-09-12 DIAGNOSIS — C85.83 LARGE CELL LYMPHOMA OF INTRA-ABDOMINAL LYMPH NODES (HCC): ICD-10-CM

## 2017-09-12 DIAGNOSIS — C85.83 OTHER SPECIFIED TYPES OF NON-HODGKIN LYMPHOMA, INTRA-ABDOMINAL LYMPH NODES (HCC): ICD-10-CM

## 2017-09-12 RX ORDER — HEPARIN SODIUM (PORCINE) LOCK FLUSH IV SOLN 100 UNIT/ML 100 UNIT/ML
500 SOLUTION INTRAVENOUS ONCE
Status: COMPLETED | OUTPATIENT
Start: 2017-09-12 | End: 2017-09-12

## 2017-09-26 ENCOUNTER — OFFICE VISIT (OUTPATIENT)
Dept: SURGERY | Age: 72
End: 2017-09-26

## 2017-09-26 VITALS
WEIGHT: 137 LBS | OXYGEN SATURATION: 100 % | HEART RATE: 69 BPM | SYSTOLIC BLOOD PRESSURE: 82 MMHG | HEIGHT: 66 IN | BODY MASS INDEX: 22.02 KG/M2 | TEMPERATURE: 98.2 F | DIASTOLIC BLOOD PRESSURE: 52 MMHG

## 2017-09-26 DIAGNOSIS — K63.89 MESENTERIC MASS: Primary | ICD-10-CM

## 2017-09-26 DIAGNOSIS — K31.5 DUODENAL STRICTURE: ICD-10-CM

## 2017-09-26 DIAGNOSIS — C85.83 LARGE CELL LYMPHOMA OF INTRA-ABDOMINAL LYMPH NODES (HCC): ICD-10-CM

## 2017-09-26 PROCEDURE — 99215 OFFICE O/P EST HI 40 MIN: CPT | Performed by: SURGERY

## 2017-09-26 RX ORDER — TIZANIDINE 4 MG/1
4 TABLET ORAL 3 TIMES DAILY PRN
COMMUNITY
End: 2018-03-19 | Stop reason: CLARIF

## 2017-09-27 ENCOUNTER — TELEPHONE (OUTPATIENT)
Dept: SURGERY | Age: 72
End: 2017-09-27

## 2017-09-28 ENCOUNTER — TELEPHONE (OUTPATIENT)
Dept: FAMILY MEDICINE CLINIC | Age: 72
End: 2017-09-28

## 2017-10-02 ENCOUNTER — OFFICE VISIT (OUTPATIENT)
Dept: FAMILY MEDICINE CLINIC | Age: 72
End: 2017-10-02

## 2017-10-02 ENCOUNTER — HOSPITAL ENCOUNTER (OUTPATIENT)
Dept: GENERAL RADIOLOGY | Age: 72
Discharge: HOME | End: 2017-10-02
Attending: SURGERY | Admitting: SURGERY

## 2017-10-02 VITALS
RESPIRATION RATE: 16 BRPM | WEIGHT: 137.6 LBS | OXYGEN SATURATION: 98 % | BODY MASS INDEX: 22.11 KG/M2 | DIASTOLIC BLOOD PRESSURE: 63 MMHG | HEART RATE: 73 BPM | HEIGHT: 66 IN | TEMPERATURE: 98.3 F | SYSTOLIC BLOOD PRESSURE: 104 MMHG

## 2017-10-02 DIAGNOSIS — K31.5 DUODENAL STRICTURE: ICD-10-CM

## 2017-10-02 DIAGNOSIS — C85.83 LARGE CELL LYMPHOMA OF INTRA-ABDOMINAL LYMPH NODES (HCC): ICD-10-CM

## 2017-10-02 DIAGNOSIS — D61.818 PANCYTOPENIA (HCC): ICD-10-CM

## 2017-10-02 DIAGNOSIS — I10 ESSENTIAL HYPERTENSION: ICD-10-CM

## 2017-10-02 DIAGNOSIS — E78.5 HYPERLIPIDEMIA WITH TARGET LDL LESS THAN 100: ICD-10-CM

## 2017-10-02 DIAGNOSIS — Z01.818 PREOP EXAMINATION: Primary | ICD-10-CM

## 2017-10-02 DIAGNOSIS — E11.22 CONTROLLED TYPE 2 DIABETES MELLITUS WITH STAGE 3 CHRONIC KIDNEY DISEASE, WITHOUT LONG-TERM CURRENT USE OF INSULIN (HCC): ICD-10-CM

## 2017-10-02 DIAGNOSIS — K63.89 MESENTERIC MASS: ICD-10-CM

## 2017-10-02 DIAGNOSIS — R53.0 NEOPLASTIC MALIGNANT RELATED FATIGUE: ICD-10-CM

## 2017-10-02 DIAGNOSIS — N18.30 CONTROLLED TYPE 2 DIABETES MELLITUS WITH STAGE 3 CHRONIC KIDNEY DISEASE, WITHOUT LONG-TERM CURRENT USE OF INSULIN (HCC): ICD-10-CM

## 2017-10-02 DIAGNOSIS — N18.30 CKD (CHRONIC KIDNEY DISEASE) STAGE 3, GFR 30-59 ML/MIN (HCC): ICD-10-CM

## 2017-10-02 PROCEDURE — 93000 ELECTROCARDIOGRAM COMPLETE: CPT | Performed by: FAMILY MEDICINE

## 2017-10-02 PROCEDURE — 99214 OFFICE O/P EST MOD 30 MIN: CPT | Performed by: FAMILY MEDICINE

## 2017-10-02 ASSESSMENT — ENCOUNTER SYMPTOMS
EYE ITCHING: 0
RECTAL PAIN: 0
SORE THROAT: 0
BACK PAIN: 0
COLOR CHANGE: 0
DIARRHEA: 0
CHEST TIGHTNESS: 0
EYE DISCHARGE: 0
NAUSEA: 0
VOMITING: 0
PHOTOPHOBIA: 0
VOICE CHANGE: 0
SINUS PRESSURE: 0
EYE PAIN: 0
EYE REDNESS: 0
CONSTIPATION: 0
APNEA: 0
ABDOMINAL DISTENTION: 0
COUGH: 0
CHOKING: 0
ABDOMINAL PAIN: 0
STRIDOR: 0
ANAL BLEEDING: 0
SHORTNESS OF BREATH: 0
TROUBLE SWALLOWING: 0
FACIAL SWELLING: 0
RHINORRHEA: 0
WHEEZING: 0
BLOOD IN STOOL: 0

## 2017-10-02 NOTE — PROGRESS NOTES
General anesthesia(GA). Prior GA:yes w/o problems. GA complications:No.  Prior local anesthesia([place]):yes w/o problems  Exercise capacity:Walks regularly. Climbs flight of stairs w/o inappropriate sob. Excellent/good functional capacity:yes. Preop paperwork from surgeon/specialist for completion:Yes. Labs/test requested from surgeon/specialist:no.  1/16/16=Normal nuclear stress test.  Denies cp/sob/dizziness/pnd/palpitations. HTN: doing well. Takes med w/o side effects. Following low salt diet:yes. Adds salt to food at the table:no. No other associated or improving factors. Denies cp/sob/pnd/ankle edema/dizziness. Diabetes:doing well. Taking medication w/o side effects. Preprandial-fasting BS=95-120s. 2hr postprandial MV=398-545m. HBA1c:6.1 on 10/13/14. 5.9 on 4/15/15;5.9 on 7/27/15. 5.8 on 1/15/16. 6.1 on 6/21/16. 6.0 on 10/27/2016.  6.4 on 4/29/17. ACEI/ARB:Yes   Diabetes eye check appt current(within 1year):yes. Improving factor:cdiet & medication. Episodes of hypoglycemia:No  ASA:Yes   LDL<100:yes. 58 on 3/28/16. Was 72 on 10/18/16. No other associated or worsening factors. Denies polyuria/polyphagia/polydipsia/BLE skin lesions/BLE paresthesia. 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 Lilian Telles. CKD3:reports doing well.:per nephro. No other new associated concerns.               Immunization History   Administered Date(s) Administered    Influenza Virus Vaccine 10/01/2012, 09/09/2013    Influenza, High Dose 09/22/2014, 10/17/2016, 09/11/2017    Pneumococcal 13-valent Conjugate ([id number]) 08/04/2015    Pneumococcal Polysaccharide ([id number]) 02/06/2012    Tdap (Boostrix, Adacel) 03/02/2009    Zoster 03/09/2011             Allergies   Allergen Reactions    Ultram [Tramadol] Nausea And Vomiting       Current Outpatient Prescriptions on File Prior to Visit   Medication Sig Dispense Refill    tiZANidine (ZANAFLEX) 4 MG tablet Take 4 mg by mouth 3 times daily as needed      simvastatin (ZOCOR) 40 MG tablet TAKE 1 TABLET NIGHTLY 90 tablet 0    Psyllium (METAMUCIL FIBER PO) Take 1 tablet by mouth 2 times daily      lisinopril (PRINIVIL;ZESTRIL) 20 MG tablet Take 20 mg by mouth daily Indications: Take prn for bp over 120/90      cephALEXin (KEFLEX) 250 MG capsule Take 250 mg by mouth 4 times daily      ACCU-CHEK SMARTVIEW strip TEST ONE TO TWO TIMES DAILY 200 strip 3    ACCU-CHEK FASTCLIX LANCETS MISC TEST ONE TO TWO TIMES DAILY 204 each 3    Alcohol Swabs (B-D SINGLE USE SWABS REGULAR) PADS USE TWICE DAILY 100 each 3    potassium chloride (KLOR-CON M) 20 MEQ extended release tablet Take 1 tablet by mouth daily 60 tablet 3    Ascorbic Acid (VITAMIN C PO) Take 100 mg by mouth daily      loratadine (CLARITIN) 10 MG tablet Take 10 mg by mouth as needed      loperamide (IMODIUM) 2 MG capsule Take 2 mg by mouth as needed for Diarrhea      metFORMIN (GLUCOPHAGE) 500 MG tablet Take 500 mg by mouth daily (with breakfast) IF FSBS OVER 110      ferrous sulfate 325 (65 FE) MG tablet Take 325 mg by mouth 3 times daily (with meals)      ondansetron (ZOFRAN) 8 MG tablet Take 1 tablet by mouth every 8 hours as needed for Nausea or Vomiting 30 tablet 5    fluticasone (FLONASE) 50 MCG/ACT nasal spray 2 sprays by Nasal route daily as needed for Rhinitis      Omega-3 Fatty Acids (FISH OIL) 1000 MG CAPS Take 1,200 mg by mouth 2 times daily       prochlorperazine (COMPAZINE) 10 MG tablet Take 1 tablet by mouth every 6 hours as needed (nausea/vomiting) 120 tablet 3    HYDROcodone-acetaminophen (NORCO) 5-325 MG per tablet Take 1 tablet by mouth every 8 hours as needed for Pain  May cause drowsiness. May impair ability to operate vehicles or machinery. Do not use in combination with alcohol.  Earliest Fill Date: 2/23/17 15 tablet 0    acetaminophen (TYLENOL) 325 MG tablet Take 650 mg by mouth every 6 hours as needed for Pain      Multiple Vitamins-Minerals ROTATOR CUFF REPAIR  2004    TUNNELED VENOUS PORT PLACEMENT  2017    [employer] [address] [place], ref # [id number] Lot [id number], 2018 MR [id number]       Social History   Substance Use Topics    Smoking status: Never Smoker    Smokeless tobacco: Never Used    Alcohol use No     History   Drug Use No       Family History   Problem Relation Age of Onset    Diabetes Mother       age 80    Cancer Mother      ovarian canver    Emphysema Father      worked at [employer]. 90s    Cancer Father      bladder cancer    Diabetes Father     Heart Attack Brother      age 59    Alzheimer's Disease Maternal Grandfather     Seizures Son              Review of Systems   Constitutional: Negative for activity change, appetite change, chills, diaphoresis, fever and unexpected weight change. Negative for:Difficulty sleeping/night sweats/unexplained weight loss or gain/malaise   HENT: Negative for congestion, dental problem, drooling, ear discharge, ear pain, facial swelling, hearing loss, mouth sores, nosebleeds, postnasal drip, rhinorrhea, sinus pressure, sneezing, sore throat, tinnitus, trouble swallowing and voice change. Negative for:Dizziness/vertigo   Eyes: Negative for photophobia, pain, discharge, redness, itching and visual disturbance. Respiratory: Negative for apnea, cough, choking, chest tightness, shortness of breath, wheezing and stridor. Negative for:Hemoptysis/hoarseness/pain on inspiration. Breasts:tenderness/masses/nipple discharge/skin changes. Cardiovascular: Negative for chest pain, palpitations and leg swelling. Negative for:Syncope/presyncope/lower extremity edema/claudication/PND/irregular heart beats   Gastrointestinal: Negative for abdominal distention, abdominal pain, anal bleeding, blood in stool, constipation, diarrhea, nausea, rectal pain and vomiting.         Negative for:Melena/bloating/dysphagia/reflux/loss of appetite   Endocrine: Negative for cold intolerance, heat intolerance, polydipsia, polyphagia and polyuria. Genitourinary: Negative for decreased urine volume, difficulty urinating, discharge, dysuria, enuresis, flank pain, frequency, genital sores, hematuria, penile pain, penile swelling, scrotal swelling, testicular pain and urgency. Musculoskeletal: Negative for arthralgias, back pain, gait problem, joint swelling, myalgias, neck pain and neck stiffness. Skin: Negative for color change, pallor, rash and wound. Neurological: Negative for dizziness, tremors, seizures, syncope, facial asymmetry, speech difficulty, weakness, light-headedness, numbness and headaches. Negative for:Visual disturbance/muscular weakness/paralysis/memory problems. Hematological: Negative for adenopathy. Does not bruise/bleed easily. Negative for:Lymph node tenderness   Psychiatric/Behavioral: Negative for agitation, behavioral problems, confusion, decreased concentration, dysphoric mood, hallucinations, self-injury, sleep disturbance and suicidal ideas. The patient is not nervous/anxious and is not hyperactive. Negative for:Homicidal tendencies(HI)       Objective:   Physical Exam   Constitutional: He is oriented to person, place, and time. Vital signs are normal. He appears well-developed and well-nourished. He is cooperative. He does not have a sickly appearance. No distress. Well hydrated/well appearing. HENT:   Head: Normocephalic and atraumatic. Right Ear: Hearing, tympanic membrane, external ear and ear canal normal.   Left Ear: Hearing, tympanic membrane, external ear and ear canal normal.   Nose: Nose normal.   Mouth/Throat: Uvula is midline, oropharynx is clear and moist and mucous membranes are normal. No oropharyngeal exudate. Hearing intact to nml conversation   Eyes: Conjunctivae, EOM and lids are normal. Pupils are equal, round, and reactive to light.    Neck: Trachea normal, normal range of motion and full passive range of motion without pain. Neck supple. No spinous process tenderness and no muscular tenderness present. No thyroid mass and no thyromegaly present. Cardiovascular: Normal rate, regular rhythm, normal heart sounds, intact distal pulses and normal pulses. No murmur heard. No bilateral leg-ankle edema. Pulmonary/Chest: Effort normal and breath sounds normal. No respiratory distress. CTAB,good AE bilaterally   Abdominal: Soft. Normal appearance and bowel sounds are normal. He exhibits no distension, no abdominal bruit and no mass. There is no splenomegaly or hepatomegaly. There is no tenderness. Musculoskeletal:        Right shoulder: Normal.        Left shoulder: Normal.        Right elbow: Normal.       Left elbow: Normal.        Right wrist: Normal.        Left wrist: Normal.        Right hip: Normal.        Left hip: Normal.        Right knee: Normal.        Left knee: Normal.        Right ankle: Normal.        Left ankle: Normal.        Cervical back: Normal.        Thoracic back: Normal.        Lumbar back: Normal.        Right upper arm: Normal.        Left upper arm: Normal.        Right forearm: Normal.        Left forearm: Normal.        Right hand: Normal.        Left hand: Normal.        Right upper leg: Normal.        Left upper leg: Normal.        Right lower leg: Normal.        Left lower leg: Normal.        Right foot: Normal.        Left foot: Normal.   Lymphadenopathy:        Head (right side): No submental, no submandibular, no tonsillar, no preauricular, no posterior auricular and no occipital adenopathy present. Head (left side): No submental, no submandibular, no tonsillar, no preauricular, no posterior auricular and no occipital adenopathy present. He has no cervical adenopathy. No supraclavicular LAD. Neurological: He is alert and oriented to person, place, and time. He has normal strength and normal reflexes. No cranial nerve deficit or sensory deficit. Nml gait.  Able related fatigue  Per specialist.               Plan:     Pre-op letter/office note has been sent(faxed) to specialist requesting preop consultation. No ASA 1week prior to procedure. Obtain labs/diagnostic tests as discussed today & call back for results within 2-7days. Pt' left office in good condition. Advised to go to local ER or call 911 for any worrisome signs/sx.

## 2017-10-02 NOTE — MR AVS SNAPSHOT
After Visit Summary             Sage Patel   10/2/2017 12:00 PM   Office Visit    Description:  Male : 1945   Provider:  Jigar León MD   Department:  [address] [place] [address]              Your Follow-Up and Future Appointments         Below is a list of your follow-up and future appointments. This may not be a complete list as you may have made appointments directly with providers that we are not aware of or your providers may have made some for you. Please call your providers to confirm appointments. It is important to keep your appointments. Please bring your current insurance card, photo ID, co-pay, and all medication bottles to your appointment. If self-pay, payment is expected at the time of service. Your To-Do List     Future Appointments Provider Department Dept Phone    10/5/2017 12:30 PM Kelley Bolton MD; OR ROOM [address] [place] [address] General Surgery [phone]    2017 8:30 AM Jigar León MD [facility] [phone]    Please arrive 15 minutes prior to appointment, bring photo ID and insurance card.          Information from Your Visit        Department     Name Address Phone Fax    [address] [address]  512 [facility] [phone]      You Were Seen for:         Comments    Preop examination   [[id number]]         Vital Signs     Blood Pressure Pulse Temperature Respirations Height Weight    104/63 (Site: Left Arm, Position: Sitting, Cuff Size: Small Adult) 73 98.3 °F (36.8 °C) (Oral) 16 5' 6\" (1.676 m) 137 lb 9.6 oz (62.4 kg)    Oxygen Saturation Body Mass Index Smoking Status             98% 22.21 kg/m2 Never Smoker            Medications and Orders      Your Current Medications Are              tiZANidine (ZANAFLEX) 4 MG tablet Take 4 mg by mouth 3 times daily as needed    simvastatin (ZOCOR) 40 MG tablet TAKE 1 TABLET NIGHTLY Psyllium (METAMUCIL FIBER PO) Take 1 tablet by mouth 2 times daily    lisinopril (PRINIVIL;ZESTRIL) 20 MG tablet Take 20 mg by mouth daily Indications: Take prn for bp over 120/90    cephALEXin (KEFLEX) 250 MG capsule Take 250 mg by mouth 4 times daily    ACCU-CHEK SMARTVIEW strip TEST ONE TO TWO TIMES DAILY    ACCU-CHEK FASTCLIX LANCETS MISC TEST ONE TO TWO TIMES DAILY    Alcohol Swabs (B-D SINGLE USE SWABS REGULAR) PADS USE TWICE DAILY    potassium chloride (KLOR-CON M) 20 MEQ extended release tablet Take 1 tablet by mouth daily    Ascorbic Acid (VITAMIN C PO) Take 100 mg by mouth daily    loratadine (CLARITIN) 10 MG tablet Take 10 mg by mouth as needed    loperamide (IMODIUM) 2 MG capsule Take 2 mg by mouth as needed for Diarrhea    metFORMIN (GLUCOPHAGE) 500 MG tablet Take 500 mg by mouth daily (with breakfast) IF FSBS OVER 110    ferrous sulfate 325 (65 FE) MG tablet Take 325 mg by mouth 3 times daily (with meals)    ondansetron (ZOFRAN) 8 MG tablet Take 1 tablet by mouth every 8 hours as needed for Nausea or Vomiting    fluticasone (FLONASE) 50 MCG/ACT nasal spray 2 sprays by Nasal route daily as needed for Rhinitis    Omega-3 Fatty Acids (FISH OIL) 1000 MG CAPS Take 1,200 mg by mouth 2 times daily     prochlorperazine (COMPAZINE) 10 MG tablet Take 1 tablet by mouth every 6 hours as needed (nausea/vomiting)    HYDROcodone-acetaminophen (NORCO) 5-325 MG per tablet Take 1 tablet by mouth every 8 hours as needed for Pain  May cause drowsiness. May impair ability to operate vehicles or machinery. Do not use in combination with alcohol. Earliest Fill Date: 2/23/17    acetaminophen (TYLENOL) 325 MG tablet Take 650 mg by mouth every 6 hours as needed for Pain    Multiple Vitamins-Minerals (THERAPEUTIC MULTIVITAMIN-MINERALS) tablet Take 1 tablet by mouth daily    Handicap Placard MISC by Does not apply route. 5 years duration    aspirin 81 MG EC tablet Take 81 mg by mouth daily.       Allergies Routine general medical examination at a health care facility    Erectile dysfunction    Abnormal EKG    Impaired fasting glucose    Vitamin D deficiency    Nodular prostate    Special screening for malignant neoplasm of prostate    Sprain of finger    Contusion of knee    Hypertrophy of prostate without urinary obstruction    Hyperlipidemia    Hypertension    Well adult exam    Allergic rhinitis      Immunizations as of 10/2/2017     Name Date    Influenza Virus Vaccine 9/9/2013, 10/1/2012    Influenza, High Dose 9/11/2017, 10/17/2016, 9/22/2014    Pneumococcal 13-valent Conjugate ([id number]) 8/4/2015    Pneumococcal Polysaccharide ([id number]) 2/6/2012    Tdap (Boostrix, Adacel) 3/2/2009    Zoster 3/9/2011      Preventive Care        Date Due    Diabetic Foot Exam 8/15/2017    Eye Exam By An Eye Doctor 8/15/2017    Hemoglobin A1C (Test For Long-Term Glucose Control) 4/29/2018    Cholesterol Screening 9/11/2018    Tetanus Combination Vaccine (2 - Td) 3/2/2019    Colonoscopy 9/6/2026            [email or web address] Signup           Our records indicate that you have an active [employer] account. You can view your After Visit Summary by going to [email or web address] and logging in with your [employer] username and password. If you don't have a [employer] username and password but a parent or guardian has access to your record, the parent or guardian should login with their own [employer] username and password and access your record to view the After Visit Summary. Additional Information  If you have questions, please contact the physician practice where you receive care. Remember, [employer] is NOT to be used for urgent needs. For medical emergencies, dial 911. For questions regarding your [employer] account call [phone]. If you have a clinical question, please call your doctor's office.

## 2017-10-02 NOTE — PROGRESS NOTES
The [facility] / [facility] ([facility]) [address], [address]    Acknowledgment of Informed Consent for Surgical or Medical Procedure and Sedation  I agree to allow doctor(s) Melvi Concepcion and his/her associates or assistants, including residents and/or other qualified medical practitioner to perform the following medical treatment or procedure and to administer or direct the administration of sedation as necessary:  Procedure(s): ROBOTIC DUODENAL STRICTURE BYPASS POSSIBLE OPEN  My doctor has explained the following regarding the proposed procedure:   the explanation of the procedure   the benefits of the procedure   the potential problems that might occur during recuperation   the risks and side effects of the procedure which could include but are not limited to severe blood loss, infection, stroke or death   the benefits, risks and side effect of alternative procedures including the consequences of declining this procedure or any alternative procedures   the likelihood of achieving satisfactory results. I acknowledge no guarantee or assurance has been made to me regarding the results. I understand that during the course of this treatment/procedure, unforeseen conditions can occur which require an additional or different procedure. I agree to allow my physician or assistants to perform such extension of the original procedure as they may find necessary. I understand that sedation will often result in temporary impairment of memory and fine motor skills and that sedation can occasionally progress to a state of deep sedation or general anesthesia. I understand the risks of anesthesia for surgery include, but are not limited to, sore throat, hoarseness, injury to face, mouth, or teeth; nausea; headache; injury to blood vessels or nerves; death, brain damage, or paralysis.     I understand that if I have a Limitation of Treatment order in effect during my hospitalization, the order may or may not be in effect during this procedure. I give my doctor permission to give me blood or blood products. I understand that there are risks with receiving blood such as hepatitis, AIDS, fever, or allergic reaction. I acknowledge that the risks, benefits, and alternatives of this treatment have been explained to me and that no express or implied warranty has been given by the hospital, any blood bank, or any person or entity as to the blood or blood components transfused. At the discretion of my doctor, I agree to allow observers, equipment/product representatives and allow photographing, and/or televising of the procedure, provided my name or identity is maintained confidentially. I agree the hospital may dispose of or use for scientific or educational purposes any tissue, fluid, or body parts which may be removed.     ________________________________Date________Time______ am/pm  ([place] [facility])  Patient or Signature of Closest Relative or Legal Guardian    ________________________________Date________Time______am/pm      Page 1 of  1  Witness

## 2017-10-03 ENCOUNTER — HOSPITAL ENCOUNTER (OUTPATIENT)
Dept: PREADMISSION TESTING | Age: 72
Discharge: HOME | End: 2017-10-03
Admitting: SURGERY

## 2017-10-03 VITALS — WEIGHT: 137 LBS | BODY MASS INDEX: 22.02 KG/M2 | HEIGHT: 66 IN

## 2017-10-03 RX ORDER — SODIUM CHLORIDE, SODIUM LACTATE, POTASSIUM CHLORIDE, CALCIUM CHLORIDE 600; 310; 30; 20 MG/100ML; MG/100ML; MG/100ML; MG/100ML
INJECTION, SOLUTION INTRAVENOUS CONTINUOUS
Status: CANCELLED | OUTPATIENT
Start: 2017-10-03

## 2017-10-03 RX ORDER — CHLORHEXIDINE GLUCONATE 0.12 MG/ML
15 RINSE ORAL 2 TIMES DAILY
Status: CANCELLED | OUTPATIENT
Start: 2017-10-03

## 2017-10-03 NOTE — PROGRESS NOTES
The following educational items and goals will be achieved upon completion of the patient's Pre-admission testing experience:             Identify the learner who is being assessed for education:  patient                    Ability to Learn:  Exhibits ability to grasp concepts and respond to questions: High  Ready to Learn: Yes  calm   Preferred Method of Learning:  verbal  Barriers to Learning: Verbalizes interest  Special Considerations due to cultural, Synagogue, spiritual beliefs:  No  Language:  English  :  Lanette Bates  [x] Appropriate evaluation / integration of data as delineated by ASPAN Standards of Perianesthesia Nursing Practice    Pain scale and pain management   [x]Patient verbalizes understanding of pain scale and pain management  [x]Pre-operative determination of patients anticipated Post-Operative pain goal:   4 of 10 on 10 point scale post op goal  [] Other     Medication(s) - Compliance with preop medication instructions  [x] Patient verbalizes understanding of preop medications (see [facility] Presurgical Instructions)    Instructions, Pre op                                                                                            [x] Patient verbalizes understanding of presurgical instructions as reviewed with phone interview nurse or in-person nurse review    Fall Risk Potential, Preoperatively                                                                                   [x]No preoperative risk identified  []Preop risk identified:                    []Sensory deficit        []Motor deficit        []Balance problem        []Home medication        []Uses assistive device                    []History of a Fall within the last 30 days    Goal(s) for fall prevention:  []Prevent fall or injury by requesting assistance with activities of daily living  []Patient / Significant other verbalizes understanding the need to call for assistance prior to getting out of bed during hospitalization      Infection Precautions                                                                                            [x] Patient understands implementation of Surgical Site Infection precautions (see [facility] Presurgical Instructions)     Patient Safety  [x] Patient identification verified  [x] Site verified    Instructions - Discharge Planning for Outpatients  [x] Patient / significant other voices understanding of home care and follow up procedures  [x] Encourage patient / significant other to review discharge instructions the day after procedure due to sedation on day of surgery    Anticipated Special Needs upon discharge:        [] Cooling device        [] Crutches       [] Kadyette Eryn        [] Wound Support device        [] Drain        [] Other      Instructions - Discharge Planning for Admitted patients  [x] Patient / significant other understands plan for admission after surgery  [] Patient / significant other understands plan for anticipated discharge dispostion        10/3/2017 10:17 AM Linn Munoz

## 2017-10-06 PROBLEM — K31.5 DUODENAL STRICTURE: Status: ACTIVE | Noted: 2017-10-06
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
## 2017-10-15 ENCOUNTER — CARE COORDINATION (OUTPATIENT)
Dept: CASE MANAGEMENT | Age: 72
End: 2017-10-15

## 2017-10-15 NOTE — CARE COORDINATION
[facility] Transitions Initial Follow Up Call    Call within 2 business days of discharge: Yes    Patient: Yuki Perdue Patient : 1945   MRN: [id number]  Reason for Admission: Duodenal Stricture with Lap Gastrojejunostomy and lysis of adhesions. Discharge Date: 10/14/17 RARS: [facility] Risk Score: 17.5     Care Transition followup call to pt and left message with request for call back. Facility: [facility]  Non-face-to-face services provided:  Obtained and reviewed discharge summary and/or continuity of care documents      Care Transitions 24 Hour Call    Patient DME:  Leslee Gilliam, Amado Fischer Patient DME:  walk in shower, 2 wheeled walker   Do you have support at home?:  Partner/Spouse/SO, Child  Are you an active caregiver in your home?:  Yes  Care Transitions Interventions         Follow Up  Future Appointments  Date Time Provider Eleanor Hopkins   2017 9:15 AM [facility]   2017 8:30 AM Harjinder Rodriguez MD [facility] [address] [facility] Transition Coordinator  [phone]  [email or web address]

## 2017-10-16 ENCOUNTER — TELEPHONE (OUTPATIENT)
Dept: PHARMACY | Facility: CLINIC | Age: 72
End: 2017-10-16

## 2017-10-16 NOTE — TELEPHONE ENCOUNTER
CLINICAL PHARMACY NOTE  Post-Discharge Transitions of Care ([facility])    Patient was discharged from [address] [place] [address] on 10/14/17. Reached patient. Inquired about reviewing his medications, to which he responds \"no, you may not, I don't need to be bothered. \"  Attempted to ask if he had any questions about his medications and he responded \"I just told you, I don't need to be bothered, quit calling me. \" and ended call.      Guru Ricardo, PharmD, [address] [facility]  Direct: [phone]  Department, toll free: [phone], option 7    =======================================================   For Pharmacy Admin Tracking Only    TCM Call Made?: Yes  [facility] ([facility]) Select Patient?: Yes  Outreach Status: Patient Refused  Care Coordinator Outreach to Patient?: Yes  Time Spent (min): 5

## 2017-10-20 ENCOUNTER — TELEPHONE (OUTPATIENT)
Dept: FAMILY MEDICINE CLINIC | Age: 72
End: 2017-10-20

## 2017-10-20 DIAGNOSIS — E11.9 TYPE 2 DIABETES MELLITUS WITHOUT COMPLICATION, UNSPECIFIED LONG TERM INSULIN USE STATUS: ICD-10-CM

## 2017-10-20 NOTE — TELEPHONE ENCOUNTER
Pharmacy is requesting     Aimee Lose  ACCU-CHEK SMARTVIEW strip  ACCU-CHEK FASTCLIX LANCETS [address] [facility]    E-script [phone]  Fax [phone]  Phone [phone]    JOO RONQUILLO

## 2017-10-21 ENCOUNTER — TELEPHONE (OUTPATIENT)
Dept: FAMILY MEDICINE CLINIC | Age: 72
End: 2017-10-21

## 2017-10-21 NOTE — TELEPHONE ENCOUNTER
Patient was wondering if he is due to get his blood checked.     Please advise  Emily Garza [phone] (home) [phone] (work)

## 2017-10-25 DIAGNOSIS — N18.30 CONTROLLED TYPE 2 DIABETES MELLITUS WITH STAGE 3 CHRONIC KIDNEY DISEASE, WITHOUT LONG-TERM CURRENT USE OF INSULIN (HCC): ICD-10-CM

## 2017-10-25 DIAGNOSIS — E78.5 HYPERLIPIDEMIA WITH TARGET LDL LESS THAN 100: ICD-10-CM

## 2017-10-25 DIAGNOSIS — I10 ESSENTIAL HYPERTENSION: ICD-10-CM

## 2017-10-25 DIAGNOSIS — E11.22 CONTROLLED TYPE 2 DIABETES MELLITUS WITH STAGE 3 CHRONIC KIDNEY DISEASE, WITHOUT LONG-TERM CURRENT USE OF INSULIN (HCC): ICD-10-CM

## 2017-10-25 LAB
A/G RATIO: 2.8 (ref 1.1–2.2)
ALBUMIN SERPL-MCNC: 3.3 G/DL (ref 3.4–5)
ALP BLD-CCNC: 64 U/L (ref 40–129)
ALT SERPL-CCNC: 17 U/L (ref 10–40)
ANION GAP SERPL CALCULATED.3IONS-SCNC: 14 MMOL/L (ref 3–16)
AST SERPL-CCNC: 30 U/L (ref 15–37)
BILIRUB SERPL-MCNC: <0.2 MG/DL (ref 0–1)
BUN BLDV-MCNC: 10 MG/DL (ref 7–20)
CALCIUM SERPL-MCNC: 8.8 MG/DL (ref 8.3–10.6)
CHLORIDE BLD-SCNC: 101 MMOL/L (ref 99–110)
CHOLESTEROL, TOTAL: 116 MG/DL (ref 0–199)
CO2: 27 MMOL/L (ref 21–32)
CREAT SERPL-MCNC: 0.8 MG/DL (ref 0.8–1.3)
ESTIMATED AVERAGE GLUCOSE: 91.1 MG/DL
GFR AFRICAN AMERICAN: >60
GFR NON-AFRICAN AMERICAN: >60
GLOBULIN: 1.2 G/DL
GLUCOSE BLD-MCNC: 88 MG/DL (ref 70–99)
HBA1C MFR BLD: 4.8 %
HDLC SERPL-MCNC: 58 MG/DL (ref 40–60)
LDL CHOLESTEROL CALCULATED: 38 MG/DL
POTASSIUM SERPL-SCNC: 4.6 MMOL/L (ref 3.5–5.1)
SODIUM BLD-SCNC: 142 MMOL/L (ref 136–145)
TOTAL PROTEIN: 4.5 G/DL (ref 6.4–8.2)
TRIGL SERPL-MCNC: 100 MG/DL (ref 0–150)
VLDLC SERPL CALC-MCNC: 20 MG/DL

## 2017-10-27 ENCOUNTER — OFFICE VISIT (OUTPATIENT)
Dept: SURGERY | Age: 72
End: 2017-10-27

## 2017-10-27 VITALS
TEMPERATURE: 99.7 F | SYSTOLIC BLOOD PRESSURE: 105 MMHG | WEIGHT: 147 LBS | OXYGEN SATURATION: 97 % | DIASTOLIC BLOOD PRESSURE: 61 MMHG | HEART RATE: 72 BPM | BODY MASS INDEX: 23.73 KG/M2

## 2017-10-27 DIAGNOSIS — K31.5 DUODENAL STRICTURE: ICD-10-CM

## 2017-10-27 DIAGNOSIS — Z98.890 POSTOPERATIVE STATE: Primary | ICD-10-CM

## 2017-10-27 DIAGNOSIS — C85.83 LARGE CELL LYMPHOMA OF INTRA-ABDOMINAL LYMPH NODES (HCC): ICD-10-CM

## 2017-10-27 PROCEDURE — 99024 POSTOP FOLLOW-UP VISIT: CPT | Performed by: SURGERY

## 2017-11-02 ENCOUNTER — OFFICE VISIT (OUTPATIENT)
Dept: FAMILY MEDICINE CLINIC | Age: 72
End: 2017-11-02

## 2017-11-02 VITALS
WEIGHT: 144.3 LBS | TEMPERATURE: 97.9 F | RESPIRATION RATE: 16 BRPM | BODY MASS INDEX: 23.19 KG/M2 | DIASTOLIC BLOOD PRESSURE: 67 MMHG | HEIGHT: 66 IN | HEART RATE: 65 BPM | OXYGEN SATURATION: 98 % | SYSTOLIC BLOOD PRESSURE: 122 MMHG

## 2017-11-02 DIAGNOSIS — C85.83 LARGE CELL LYMPHOMA OF INTRA-ABDOMINAL LYMPH NODES (HCC): ICD-10-CM

## 2017-11-02 DIAGNOSIS — E11.22 CONTROLLED TYPE 2 DIABETES MELLITUS WITH STAGE 3 CHRONIC KIDNEY DISEASE, WITHOUT LONG-TERM CURRENT USE OF INSULIN (HCC): ICD-10-CM

## 2017-11-02 DIAGNOSIS — E78.5 HYPERLIPIDEMIA WITH TARGET LDL LESS THAN 100: ICD-10-CM

## 2017-11-02 DIAGNOSIS — D61.818 PANCYTOPENIA (HCC): ICD-10-CM

## 2017-11-02 DIAGNOSIS — K31.5 DUODENAL STRICTURE: ICD-10-CM

## 2017-11-02 DIAGNOSIS — N18.30 CKD (CHRONIC KIDNEY DISEASE) STAGE 3, GFR 30-59 ML/MIN (HCC): ICD-10-CM

## 2017-11-02 DIAGNOSIS — I10 ESSENTIAL HYPERTENSION, BENIGN: Primary | ICD-10-CM

## 2017-11-02 DIAGNOSIS — N18.30 CONTROLLED TYPE 2 DIABETES MELLITUS WITH STAGE 3 CHRONIC KIDNEY DISEASE, WITHOUT LONG-TERM CURRENT USE OF INSULIN (HCC): ICD-10-CM

## 2017-11-02 PROCEDURE — 1111F DSCHRG MED/CURRENT MED MERGE: CPT | Performed by: FAMILY MEDICINE

## 2017-11-02 PROCEDURE — 3044F HG A1C LEVEL LT 7.0%: CPT | Performed by: FAMILY MEDICINE

## 2017-11-02 PROCEDURE — 99214 OFFICE O/P EST MOD 30 MIN: CPT | Performed by: FAMILY MEDICINE

## 2017-11-02 PROCEDURE — G8427 DOCREV CUR MEDS BY ELIG CLIN: HCPCS | Performed by: FAMILY MEDICINE

## 2017-11-02 PROCEDURE — 1123F ACP DISCUSS/DSCN MKR DOCD: CPT | Performed by: FAMILY MEDICINE

## 2017-11-02 PROCEDURE — 1036F TOBACCO NON-USER: CPT | Performed by: FAMILY MEDICINE

## 2017-11-02 PROCEDURE — 3017F COLORECTAL CA SCREEN DOC REV: CPT | Performed by: FAMILY MEDICINE

## 2017-11-02 PROCEDURE — G8484 FLU IMMUNIZE NO ADMIN: HCPCS | Performed by: FAMILY MEDICINE

## 2017-11-02 PROCEDURE — 4040F PNEUMOC VAC/ADMIN/RCVD: CPT | Performed by: FAMILY MEDICINE

## 2017-11-02 PROCEDURE — G8420 CALC BMI NORM PARAMETERS: HCPCS | Performed by: FAMILY MEDICINE

## 2017-11-02 RX ORDER — POTASSIUM CHLORIDE 20 MEQ/1
20 TABLET, EXTENDED RELEASE ORAL DAILY
Qty: 30 TABLET | Refills: 3 | Status: SHIPPED | OUTPATIENT
Start: 2017-11-02 | End: 2018-02-02 | Stop reason: SDUPTHER

## 2017-11-02 ASSESSMENT — ENCOUNTER SYMPTOMS
APNEA: 0
COLOR CHANGE: 0
ABDOMINAL DISTENTION: 0
SHORTNESS OF BREATH: 0
CHOKING: 0
RECTAL PAIN: 0
ABDOMINAL PAIN: 0
DIARRHEA: 0
BLOOD IN STOOL: 0
ANAL BLEEDING: 0
WHEEZING: 0
CONSTIPATION: 0
NAUSEA: 0
CHEST TIGHTNESS: 0
STRIDOR: 0
VOMITING: 0
COUGH: 0
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
## 2017-12-05 DIAGNOSIS — E78.5 HYPERLIPIDEMIA WITH TARGET LDL LESS THAN 100: ICD-10-CM

## 2017-12-05 RX ORDER — SIMVASTATIN 40 MG
TABLET ORAL
Qty: 90 TABLET | Refills: 0 | Status: SHIPPED | OUTPATIENT
Start: 2017-12-05 | End: 2018-04-17 | Stop reason: SDUPTHER

## 2018-02-02 RX ORDER — POTASSIUM CHLORIDE 20 MEQ/1
TABLET, EXTENDED RELEASE ORAL
Qty: 90 TABLET | Refills: 3 | Status: SHIPPED | OUTPATIENT
Start: 2018-02-02 | End: 2018-06-22 | Stop reason: CLARIF

## 2018-02-05 ENCOUNTER — OFFICE VISIT (OUTPATIENT)
Dept: FAMILY MEDICINE CLINIC | Age: 73
End: 2018-02-05

## 2018-02-05 VITALS
WEIGHT: 151.4 LBS | HEIGHT: 66 IN | OXYGEN SATURATION: 98 % | DIASTOLIC BLOOD PRESSURE: 72 MMHG | HEART RATE: 84 BPM | BODY MASS INDEX: 24.33 KG/M2 | SYSTOLIC BLOOD PRESSURE: 117 MMHG | TEMPERATURE: 98.1 F | RESPIRATION RATE: 16 BRPM

## 2018-02-05 DIAGNOSIS — N18.30 CONTROLLED TYPE 2 DIABETES MELLITUS WITH STAGE 3 CHRONIC KIDNEY DISEASE, WITHOUT LONG-TERM CURRENT USE OF INSULIN (HCC): Primary | ICD-10-CM

## 2018-02-05 DIAGNOSIS — I10 ESSENTIAL HYPERTENSION: ICD-10-CM

## 2018-02-05 DIAGNOSIS — N18.30 CKD (CHRONIC KIDNEY DISEASE) STAGE 3, GFR 30-59 ML/MIN (HCC): ICD-10-CM

## 2018-02-05 DIAGNOSIS — D61.818 PANCYTOPENIA (HCC): ICD-10-CM

## 2018-02-05 DIAGNOSIS — K31.5 DUODENAL STRICTURE: ICD-10-CM

## 2018-02-05 DIAGNOSIS — E78.5 HYPERLIPIDEMIA WITH TARGET LDL LESS THAN 100: ICD-10-CM

## 2018-02-05 DIAGNOSIS — J30.1 CHRONIC SEASONAL ALLERGIC RHINITIS DUE TO POLLEN: ICD-10-CM

## 2018-02-05 DIAGNOSIS — E11.22 CONTROLLED TYPE 2 DIABETES MELLITUS WITH STAGE 3 CHRONIC KIDNEY DISEASE, WITHOUT LONG-TERM CURRENT USE OF INSULIN (HCC): Primary | ICD-10-CM

## 2018-02-05 DIAGNOSIS — C85.83 LARGE CELL LYMPHOMA OF INTRA-ABDOMINAL LYMPH NODES (HCC): ICD-10-CM

## 2018-02-05 PROCEDURE — 1036F TOBACCO NON-USER: CPT | Performed by: FAMILY MEDICINE

## 2018-02-05 PROCEDURE — 99214 OFFICE O/P EST MOD 30 MIN: CPT | Performed by: FAMILY MEDICINE

## 2018-02-05 PROCEDURE — 4040F PNEUMOC VAC/ADMIN/RCVD: CPT | Performed by: FAMILY MEDICINE

## 2018-02-05 PROCEDURE — 1123F ACP DISCUSS/DSCN MKR DOCD: CPT | Performed by: FAMILY MEDICINE

## 2018-02-05 PROCEDURE — G8484 FLU IMMUNIZE NO ADMIN: HCPCS | Performed by: FAMILY MEDICINE

## 2018-02-05 PROCEDURE — 3017F COLORECTAL CA SCREEN DOC REV: CPT | Performed by: FAMILY MEDICINE

## 2018-02-05 PROCEDURE — 3046F HEMOGLOBIN A1C LEVEL >9.0%: CPT | Performed by: FAMILY MEDICINE

## 2018-02-05 PROCEDURE — G8420 CALC BMI NORM PARAMETERS: HCPCS | Performed by: FAMILY MEDICINE

## 2018-02-05 PROCEDURE — G8427 DOCREV CUR MEDS BY ELIG CLIN: HCPCS | Performed by: FAMILY MEDICINE

## 2018-02-05 ASSESSMENT — ENCOUNTER SYMPTOMS
NAUSEA: 0
ANAL BLEEDING: 0
ABDOMINAL DISTENTION: 0
SHORTNESS OF BREATH: 0
RECTAL PAIN: 0
BLOOD IN STOOL: 0
COUGH: 0
WHEEZING: 0
DIARRHEA: 0
ABDOMINAL PAIN: 0
CHEST TIGHTNESS: 0
CONSTIPATION: 0
VOMITING: 0
CHOKING: 0
APNEA: 0
STRIDOR: 0

## 2018-02-05 ASSESSMENT — PATIENT HEALTH QUESTIONNAIRE - PHQ9
SUM OF ALL RESPONSES TO PHQ9 QUESTIONS 1 & 2: 0
SUM OF ALL RESPONSES TO PHQ QUESTIONS 1-9: 0
1. LITTLE INTEREST OR PLEASURE IN DOING THINGS: 0
2. FEELING DOWN, DEPRESSED OR HOPELESS: 0

## 2018-02-05 NOTE — PROGRESS NOTES
[place]. Assessment:     1. Controlled type 2 diabetes mellitus with stage 3 chronic kidney disease, without long-term current use of insulin ([address]  VSS/well appearing. Stable. A1c due 4/2018. Diabetes:Check feet daily. Yrly eye checks advised. Education: Reviewed ABCs of diabetes management (respective goals in parentheses):  A1C (<7), blood pressure (<130/80), and cholesterol (LDL <100). Counseled at this visit on the following: diabetes complication prevention, foot care. 2. Essential hypertension  Stable. 3. 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 4. Chronic seasonal allergic rhinitis due to pollen  Stable. 5. Hyperlipidemia with target LDL less than 100  Stable. Labs due 4/2018. 6. CKD (chronic kidney disease) stage 3, GFR 30-59 ml/min  Stable. Per nephro. 7. Pancytopenia ([address]  Per specialists. 8. Duodenal stricture  S/p tx:continue care plan per specialist.             Plan:         Pt' left office in good condition. Obtain labs/diagnostic tests as discussed today & call back for results within 2-7days.   Advised to go to local ER or call 911 for

## 2018-03-01 ENCOUNTER — OFFICE VISIT (OUTPATIENT)
Dept: FAMILY MEDICINE CLINIC | Age: 73
End: 2018-03-01

## 2018-03-01 VITALS
BODY MASS INDEX: 24.77 KG/M2 | OXYGEN SATURATION: 98 % | SYSTOLIC BLOOD PRESSURE: 118 MMHG | TEMPERATURE: 98.2 F | HEART RATE: 81 BPM | DIASTOLIC BLOOD PRESSURE: 74 MMHG | RESPIRATION RATE: 16 BRPM | WEIGHT: 154.1 LBS | HEIGHT: 66 IN

## 2018-03-01 DIAGNOSIS — M25.511 ACUTE PAIN OF RIGHT SHOULDER: Primary | ICD-10-CM

## 2018-03-01 PROCEDURE — G8484 FLU IMMUNIZE NO ADMIN: HCPCS | Performed by: FAMILY MEDICINE

## 2018-03-01 PROCEDURE — 3017F COLORECTAL CA SCREEN DOC REV: CPT | Performed by: FAMILY MEDICINE

## 2018-03-01 PROCEDURE — 1123F ACP DISCUSS/DSCN MKR DOCD: CPT | Performed by: FAMILY MEDICINE

## 2018-03-01 PROCEDURE — G8420 CALC BMI NORM PARAMETERS: HCPCS | Performed by: FAMILY MEDICINE

## 2018-03-01 PROCEDURE — 99214 OFFICE O/P EST MOD 30 MIN: CPT | Performed by: FAMILY MEDICINE

## 2018-03-01 PROCEDURE — 4040F PNEUMOC VAC/ADMIN/RCVD: CPT | Performed by: FAMILY MEDICINE

## 2018-03-01 PROCEDURE — G8427 DOCREV CUR MEDS BY ELIG CLIN: HCPCS | Performed by: FAMILY MEDICINE

## 2018-03-01 PROCEDURE — 1036F TOBACCO NON-USER: CPT | Performed by: FAMILY MEDICINE

## 2018-03-01 ASSESSMENT — ENCOUNTER SYMPTOMS
VOMITING: 0
DIARRHEA: 0
WHEEZING: 0
STRIDOR: 0
CHOKING: 0
SHORTNESS OF BREATH: 0
ABDOMINAL DISTENTION: 0
APNEA: 0
BLOOD IN STOOL: 0
ANAL BLEEDING: 0
NAUSEA: 0
BACK PAIN: 0
ABDOMINAL PAIN: 0
CHEST TIGHTNESS: 0
CONSTIPATION: 0
COUGH: 0

## 2018-03-01 NOTE — PROGRESS NOTES
diaphoresis, fatigue, fever and unexpected weight change. Respiratory: Negative for apnea, cough, choking, chest tightness, shortness of breath, wheezing and stridor. Cardiovascular: Negative for chest pain, palpitations and leg swelling. Gastrointestinal: Negative for abdominal distention, abdominal pain, anal bleeding, blood in stool, constipation, diarrhea, nausea and vomiting. Musculoskeletal: Positive for arthralgias. Negative for back pain, gait problem, joint swelling, neck pain and neck stiffness. Skin: Negative for rash and wound. Neurological: Negative for dizziness, weakness, light-headedness, numbness and headaches. Hematological: Negative for adenopathy. Objective:   Physical Exam   Constitutional: He is oriented to person, place, and time. Vital signs are normal. He appears well-developed and well-nourished. He is cooperative. He does not have a sickly appearance. No distress. HENT:   Nose: Nose normal.   Mouth/Throat: Uvula is midline, oropharynx is clear and moist and mucous membranes are normal.   Hearing intact to nml conversation   Eyes: Conjunctivae, EOM and lids are normal. Pupils are equal, round, and reactive to light. Neck: Trachea normal and normal range of motion. Neck supple. Cardiovascular: Normal rate, regular rhythm, normal heart sounds, intact distal pulses and normal pulses. Pulmonary/Chest: Effort normal and breath sounds normal.   CTAB,good AE bilaterally   Abdominal: Soft. Normal appearance and bowel sounds are normal. He exhibits no distension and no mass. There is no tenderness. Musculoskeletal:        Right shoulder: He exhibits tenderness. He exhibits normal range of motion, no bony tenderness, no swelling, no effusion, no crepitus, no deformity, no laceration, no spasm, normal pulse and normal strength.         Left shoulder: Normal.        Right elbow: Normal.       Left elbow: Normal.        Right wrist: Normal.        Left wrist: Normal. Cervical back: Normal.        Right upper arm: Normal.        Left upper arm: Normal.        Right forearm: Normal.        Left forearm: Normal.        Arms:       Right hand: Normal.        Left hand: Normal.   BUE:nml neurosensory-motor-vascular exam.     Lymphadenopathy:     He has no cervical adenopathy. Neurological: He is alert and oriented to person, place, and time. Skin: Skin is warm, dry and intact. No rash noted. He is not diaphoretic. No cyanosis. No pallor. Good skin turgor. Capillary refill=2-3 secs. Psychiatric: He has a normal mood and affect. Assessment:      1. Acute pain of right shoulder  VSS/well appearing. Likely shoulder muscle sprain. Rest/otc tylenol(max daily=3g)/ice-heat pad/shoulder stretch regime per AVS printout. Xray eval if not improving. XR SHOULDER RIGHT (MIN 2 VIEWS)           Plan:      Call or return to clinic prn if these symptoms worsen or fail to improve as anticipated. Pt' left office in good condition. Advised to go to local ER or call 911 for any worrisome signs/sx including but not limited to worsening of current complaint.

## 2018-03-14 ENCOUNTER — HOSPITAL ENCOUNTER (OUTPATIENT)
Dept: OTHER | Age: 73
Discharge: HOME | End: 2018-03-14
Attending: FAMILY MEDICINE | Admitting: FAMILY MEDICINE

## 2018-03-14 ENCOUNTER — TELEPHONE (OUTPATIENT)
Dept: FAMILY MEDICINE CLINIC | Age: 73
End: 2018-03-14

## 2018-03-14 DIAGNOSIS — M25.511 ACUTE PAIN OF RIGHT SHOULDER: ICD-10-CM

## 2018-03-14 DIAGNOSIS — R93.89 ABNORMAL CXR: Primary | ICD-10-CM

## 2018-03-14 DIAGNOSIS — C85.83 LARGE CELL LYMPHOMA OF INTRA-ABDOMINAL LYMPH NODES (HCC): ICD-10-CM

## 2018-03-14 NOTE — TELEPHONE ENCOUNTER
Notify pt':  Right shoulder xray is abnml:it shows arthritis changes but also right lung abnormality. CT chest is recommended. Order placed:pls assist pt' to schedule within 1-2days. Interim, if he develops sob/severe cough/fever/chills then go to local ER.  f/u with me within 1week.

## 2018-03-15 ENCOUNTER — TELEPHONE (OUTPATIENT)
Dept: FAMILY MEDICINE CLINIC | Age: 73
End: 2018-03-15

## 2018-03-15 ENCOUNTER — HOSPITAL ENCOUNTER (OUTPATIENT)
Dept: CT IMAGING | Age: 73
Discharge: HOME | End: 2018-03-15
Attending: FAMILY MEDICINE | Admitting: FAMILY MEDICINE

## 2018-03-15 DIAGNOSIS — R93.89 ABNORMAL CXR: ICD-10-CM

## 2018-03-15 DIAGNOSIS — C85.83 LARGE CELL LYMPHOMA OF INTRA-ABDOMINAL LYMPH NODES (HCC): ICD-10-CM

## 2018-03-15 DIAGNOSIS — R93.89 ABNORMAL FINDINGS ON DIAGNOSTIC IMAGING OF OTHER SPECIFIED BODY STRUCTURES: ICD-10-CM

## 2018-03-15 NOTE — TELEPHONE ENCOUNTER
Spoke with [facility] from Radiology Dept to inform them to send pt to ER dept. I spoke with Charge RN, Piero. I informed that pt was coming to ER from Radiology regarding abnormal CT.   JENSEN

## 2018-03-19 ENCOUNTER — TELEPHONE (OUTPATIENT)
Dept: PULMONOLOGY | Age: 73
End: 2018-03-19

## 2018-03-19 ENCOUNTER — HOSPITAL ENCOUNTER (OUTPATIENT)
Dept: OTHER | Age: 73
Discharge: HOME | End: 2018-03-19
Attending: INTERNAL MEDICINE | Admitting: INTERNAL MEDICINE

## 2018-03-19 DIAGNOSIS — J90 PLEURAL EFFUSION: ICD-10-CM

## 2018-03-19 DIAGNOSIS — J90 PLEURAL EFFUSION: Primary | ICD-10-CM

## 2018-03-19 PROBLEM — M54.50 ACUTE RIGHT-SIDED LOW BACK PAIN WITHOUT SCIATICA: Status: RESOLVED | Noted: 2017-02-23 | Resolved: 2018-03-19

## 2018-03-19 PROBLEM — J94.8 HYDROPNEUMOTHORAX: Status: ACTIVE | Noted: 2018-03-19

## 2018-03-19 PROBLEM — M54.6 ACUTE RIGHT-SIDED THORACIC BACK PAIN: Status: RESOLVED | Noted: 2017-02-23 | Resolved: 2018-03-19

## 2018-03-19 PROBLEM — E86.0 DEHYDRATION: Status: RESOLVED | Noted: 2017-03-31 | Resolved: 2018-03-19

## 2018-03-29 ENCOUNTER — OFFICE VISIT (OUTPATIENT)
Dept: FAMILY MEDICINE CLINIC | Age: 73
End: 2018-03-29

## 2018-03-29 VITALS
RESPIRATION RATE: 16 BRPM | SYSTOLIC BLOOD PRESSURE: 102 MMHG | WEIGHT: 150.1 LBS | HEIGHT: 66 IN | DIASTOLIC BLOOD PRESSURE: 64 MMHG | BODY MASS INDEX: 24.12 KG/M2 | OXYGEN SATURATION: 98 % | TEMPERATURE: 99.1 F | HEART RATE: 85 BPM

## 2018-03-29 DIAGNOSIS — C45.9 MESOTHELIOMA (HCC): Primary | ICD-10-CM

## 2018-03-29 DIAGNOSIS — C85.83 LARGE CELL LYMPHOMA OF INTRA-ABDOMINAL LYMPH NODES (HCC): ICD-10-CM

## 2018-03-29 DIAGNOSIS — N18.30 CONTROLLED TYPE 2 DIABETES MELLITUS WITH STAGE 3 CHRONIC KIDNEY DISEASE, WITHOUT LONG-TERM CURRENT USE OF INSULIN (HCC): ICD-10-CM

## 2018-03-29 DIAGNOSIS — I10 ESSENTIAL HYPERTENSION, BENIGN: ICD-10-CM

## 2018-03-29 DIAGNOSIS — J94.8 HYDROPNEUMOTHORAX: ICD-10-CM

## 2018-03-29 DIAGNOSIS — E11.22 CONTROLLED TYPE 2 DIABETES MELLITUS WITH STAGE 3 CHRONIC KIDNEY DISEASE, WITHOUT LONG-TERM CURRENT USE OF INSULIN (HCC): ICD-10-CM

## 2018-03-29 DIAGNOSIS — J90 PLEURAL EFFUSION: ICD-10-CM

## 2018-03-29 PROCEDURE — 3046F HEMOGLOBIN A1C LEVEL >9.0%: CPT | Performed by: FAMILY MEDICINE

## 2018-03-29 PROCEDURE — G8420 CALC BMI NORM PARAMETERS: HCPCS | Performed by: FAMILY MEDICINE

## 2018-03-29 PROCEDURE — 3017F COLORECTAL CA SCREEN DOC REV: CPT | Performed by: FAMILY MEDICINE

## 2018-03-29 PROCEDURE — G8482 FLU IMMUNIZE ORDER/ADMIN: HCPCS | Performed by: FAMILY MEDICINE

## 2018-03-29 PROCEDURE — 1123F ACP DISCUSS/DSCN MKR DOCD: CPT | Performed by: FAMILY MEDICINE

## 2018-03-29 PROCEDURE — G8427 DOCREV CUR MEDS BY ELIG CLIN: HCPCS | Performed by: FAMILY MEDICINE

## 2018-03-29 PROCEDURE — 99214 OFFICE O/P EST MOD 30 MIN: CPT | Performed by: FAMILY MEDICINE

## 2018-03-29 PROCEDURE — 1036F TOBACCO NON-USER: CPT | Performed by: FAMILY MEDICINE

## 2018-03-29 PROCEDURE — 4040F PNEUMOC VAC/ADMIN/RCVD: CPT | Performed by: FAMILY MEDICINE

## 2018-03-29 PROCEDURE — 1111F DSCHRG MED/CURRENT MED MERGE: CPT | Performed by: FAMILY MEDICINE

## 2018-03-29 ASSESSMENT — ENCOUNTER SYMPTOMS
COUGH: 0
APNEA: 0
NAUSEA: 0
CONSTIPATION: 0
WHEEZING: 0
VOMITING: 0
ABDOMINAL PAIN: 0
DIARRHEA: 0
ABDOMINAL DISTENTION: 0
SHORTNESS OF BREATH: 0

## 2018-03-29 NOTE — PROGRESS NOTES
somewhat  vacuolated-appearing cytoplasm. Focal spindling of these cells is noted,  as well as punctate areas of necrosis. Mitotic figures are easily  identified. The tumor focally appears to invade foci of accompanying  pulmonary (alveolar) parenchyma. In order to further characterize this neoplasm, a panel of  immunoperoxidase stains was performed on a selected paraffin block  (pankeratin, CK-7, calretinin, D2-40, CK-5/6, CK-20, TTF-1, LCA, CD20,  HMB-45, and MART-1). The tumor was markedly positive with pankeratin,  CK-7, calretinin, with focal positivity being noted on the D2-40 (weak),  and patchy positivity noted on the CK-5/6. The tumor was negative for  CD20 and LCA (ruling out a lymphoproliferative process) and negative for  HMB-45 and MART-1 (excluding metastatic malignant melanoma). The negative  TTF-1 argues against a pulmonary parenchyma adenocarcinoma. The strong  calretinin positivity, in addition to the CK-5/6 and patchy D2-40  positivity supports the impression of malignant mesothelioma. As part of this department's review process, this case has been  prospectively reviewed by multiple members of the department [facility], [facility] Pathologists) who are in agreement with the above  interpretation.    VAGJO/VAGJO            SPECIMEN:  TUMOR RIGHT CHEST WALL    GROSS DESCRIPTION:   Received in formalin, labeled \"tumor, right chest  wall\", consists of an aggregate of ragged, tan, focally hemorrhagic soft  tissues (6.3 x 4.6 x 1.5 cm), which are sectioned to reveal tan/white,  focally hemorrhagic, solid cut surfaces. Representative sections are  submitted in 5 cassettes.   FUNBR/PJS    MICROSCOPIC DESCRIPTION: Microscopic examination was performed as per the  Gross Description.  All described special stains have appropriate controls  and have been ordered as medically necessary 1) following (prior) review  of the corresponding H&E slides(s) and/or 2) in special case types, such  as medical liver core biopsies, medical kidney core biopsies, sentinel  lymph nodes, bone marrow biopsies, and other appropriate specimens, as  required for diagnosis. Please note that poor fixation or modification of the specimen may alter  special staining characteristics, among the possible factors: drying or  cautery of the tissue surface, poor fixation (large size/excessive  thickness of block, presence of elastic or thick fibrous capsule,  slowly-penetrating fixative, insufficient fixation time, or movement of  intracellular antigens into the extracellular compartments),  decalcification (which may cause false negative findings particularly on  nuclear IHC/ASTRID stains), frozen sectioning, and other than  formalin-fixed, paraffin-embedded tissue. Please also note that this list  is not all-inclusive. CPT: M1404710 X1   80185 X10   88342 X1    Copy to: Anaya Borja MD  Case reviewed at [facility], [address]  Technical processing at [facility], [address] [place] [address]  Phone [phone]        Siddhartha Simpson M.D.  (Electronic Signature)  03/27/2018                                                                     Page 1 of 1   Lab and Collection     Surgical Pathology on 3/22/2018   Result History     Surgical Pathology on 3/27/2018          Result Information     Status: Final result (Collected: 3/22/2018 00:00) Provider Status: Reviewed   Reviewed By Sammy Jolley MD on 3/28/2018 12:50   Nader Jolley MD on 3/28/2018 12:50   Janie Alexandre MD on 3/27/2018 19:38   Minoo Hernandez MD on 3/27/2018 18:43         HTN:doing well. Taking med w/o side effects. Following low salt diet:yes. Adds salt to food at the table:no. No other associated or improving factors. Denies cp/sob/pnd/ankle edema/dizziness. Diabetes f/u:doing well. Takes medication w/o side effects. Preprandial-fasting BS=90-120s.  2hr postprandial UO=[id number]. HBA1c:6.1 on 10/13/14. 5.9 on 4/15/15;5.9 on 7/27/15. 5.8 on 1/15/16. 6.1 on 6/21/16. 6.0 on 10/27/2016.  6.4 on 4/29/17. 4.8 on 10/25/17. ACEI/ARB:Yes   Diabetes eye check appt current(within 1year):yes. Improving factor:his current diet & medication. Episodes of hypoglycemia:None. ASA:Yes   LDL<100:yes. 58 on 3/28/16. 65 on 10/18/16. 38 on 10/25/17. No other new associated or worsening factors. Denies polyuria/polyphagia/polydipsia/BLE skin lesions/BLE paresthesia. Allergies   Allergen Reactions    Ultram [Tramadol] Nausea And Vomiting       Current Outpatient Prescriptions on File Prior to Visit   Medication Sig Dispense Refill    ascorbic acid (VITAMIN C) 100 MG tablet Take 100 mg by mouth daily      Omega-3 Fatty Acids (FISH OIL) 1200 MG CAPS Take 1 capsule by mouth 2 times daily      acetaminophen (TYLENOL) 500 MG tablet Take 500 mg by mouth every 6 hours as needed for Pain      metFORMIN (GLUCOPHAGE-XR) 500 MG extended release tablet Take 500 mg by mouth daily as needed If fasting blood glucose >110      potassium chloride (KLOR-CON M) 20 MEQ extended release tablet TAKE 1 TABLET EVERY DAY 90 tablet 3    simvastatin (ZOCOR) 40 MG tablet TAKE 1 TABLET NIGHTLY 90 tablet 0    lisinopril (PRINIVIL;ZESTRIL) 20 MG tablet Take 20 mg by mouth daily as needed Take if SBP >120/90      loratadine (CLARITIN) 10 MG tablet Take 10 mg by mouth as needed      ferrous sulfate 325 (65 FE) MG tablet Take 325 mg by mouth 3 times daily (with meals)      fluticasone (FLONASE) 50 MCG/ACT nasal spray 2 sprays by Nasal route daily as needed for Rhinitis      Multiple Vitamins-Minerals (THERAPEUTIC MULTIVITAMIN-MINERALS) tablet Take 1 tablet by mouth daily      aspirin 81 MG EC tablet Take 81 mg by mouth daily.        Current Facility-Administered Medications on File Prior to Visit   Medication Dose Route Frequency Provider Last Rate Last Dose    0.9 % sodium chloride bolus Objective:   Physical Exam   Constitutional: He is oriented to person, place, and time. He appears well-developed and well-nourished. He is cooperative. He does not have a sickly appearance. No distress. HENT:   Nose: Nose normal.   Mouth/Throat: Uvula is midline, oropharynx is clear and moist and mucous membranes are normal.   Hearing intact to nml conversation   Eyes: Conjunctivae, EOM and lids are normal. Pupils are equal, round, and reactive to light. Neck: Trachea normal and normal range of motion. Neck supple. Cardiovascular: Normal rate, regular rhythm, normal heart sounds, intact distal pulses and normal pulses. Pulmonary/Chest: Effort normal and breath sounds normal.   CTAB,good AE bilaterally   Abdominal: Soft. Normal appearance and bowel sounds are normal. He exhibits no distension and no mass. There is no splenomegaly or hepatomegaly. There is no tenderness. There is no rebound and no guarding. Musculoskeletal:   Ambulating well with walker. Lymphadenopathy:     He has no cervical adenopathy. No supraclavicular LAD. Neurological: He is alert and oriented to person, place, and time. Skin: Skin is warm, dry and intact. Good skin turgor. Capillary refill=2-3 secs. Psychiatric: He has a normal mood and affect. Assessment:      1. Mesothelioma:right  VSS/well appearing. See plan. Pt' to keep f/u appt with Dr. Maggi Cavanaugh tomorrow. Right chest wall tumor     - Involved by poorly differentiated non-small cell malignancy, most       consistent with epithelioid malignant mesothelioma   2. Essential hypertension, benign  Stable. 3. Controlled type 2 diabetes mellitus with stage 3 chronic kidney disease, without long-term current use of insulin (HCC)  Stable. 4. Hydropneumothorax  Stable. 5. Pleural effusion  s/p tx:improved. 6. 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 Maggi Cavanaugh.              Plan:        Mesothelioma:Pt' states he is not aware of recent bx results & does not wish to know the results today. States he would like to wait until his visit with Dr. Tammie Saldivar) tomorrow for these results. Pt' left office in good condition. Advised to go to local ER or call 911 for any worrisome signs/sx.

## 2018-04-05 ENCOUNTER — TELEPHONE (OUTPATIENT)
Dept: CARDIOTHORACIC SURGERY | Age: 73
End: 2018-04-05

## 2018-04-11 ENCOUNTER — OFFICE VISIT (OUTPATIENT)
Dept: CARDIOTHORACIC SURGERY | Age: 73
End: 2018-04-11

## 2018-04-11 VITALS
OXYGEN SATURATION: 95 % | SYSTOLIC BLOOD PRESSURE: 130 MMHG | DIASTOLIC BLOOD PRESSURE: 64 MMHG | HEIGHT: 66 IN | WEIGHT: 146.6 LBS | BODY MASS INDEX: 23.56 KG/M2 | HEART RATE: 95 BPM | TEMPERATURE: 99 F

## 2018-04-11 DIAGNOSIS — Z09 FOLLOW-UP EXAMINATION FOLLOWING SURGERY: Primary | ICD-10-CM

## 2018-04-11 PROCEDURE — 99024 POSTOP FOLLOW-UP VISIT: CPT | Performed by: THORACIC SURGERY (CARDIOTHORACIC VASCULAR SURGERY)

## 2018-04-11 RX ORDER — TIZANIDINE 4 MG/1
4 TABLET ORAL EVERY 8 HOURS PRN
COMMUNITY
End: 2018-09-13 | Stop reason: CLARIF

## 2018-04-11 RX ORDER — DEXAMETHASONE 4 MG/1
4 TABLET ORAL 2 TIMES DAILY
COMMUNITY
End: 2018-09-13 | Stop reason: CLARIF

## 2018-04-11 RX ORDER — ONDANSETRON 8 MG/1
8 TABLET, ORALLY DISINTEGRATING ORAL
COMMUNITY
End: 2018-06-22 | Stop reason: CLARIF

## 2018-04-11 RX ORDER — SENNA AND DOCUSATE SODIUM 50; 8.6 MG/1; MG/1
1 TABLET, FILM COATED ORAL EVERY 12 HOURS
COMMUNITY

## 2018-04-11 RX ORDER — FOLIC ACID 1 MG/1
1 TABLET ORAL DAILY
COMMUNITY
End: 2018-09-13 | Stop reason: CLARIF

## 2018-04-17 DIAGNOSIS — E78.5 HYPERLIPIDEMIA WITH TARGET LDL LESS THAN 100: ICD-10-CM

## 2018-04-17 RX ORDER — SIMVASTATIN 40 MG
TABLET ORAL
Qty: 90 TABLET | Refills: 0 | Status: SHIPPED | OUTPATIENT
Start: 2018-04-17 | End: 2018-06-28 | Stop reason: SDUPTHER

## 2018-04-27 ENCOUNTER — OFFICE VISIT (OUTPATIENT)
Dept: FAMILY MEDICINE CLINIC | Age: 73
End: 2018-04-27

## 2018-04-27 VITALS
RESPIRATION RATE: 16 BRPM | TEMPERATURE: 97.7 F | HEIGHT: 66 IN | SYSTOLIC BLOOD PRESSURE: 100 MMHG | HEART RATE: 74 BPM | BODY MASS INDEX: 22.02 KG/M2 | OXYGEN SATURATION: 98 % | DIASTOLIC BLOOD PRESSURE: 70 MMHG | WEIGHT: 137 LBS

## 2018-04-27 DIAGNOSIS — C85.83 LARGE CELL LYMPHOMA OF INTRA-ABDOMINAL LYMPH NODES (HCC): ICD-10-CM

## 2018-04-27 DIAGNOSIS — C45.9 MESOTHELIOMA (HCC): ICD-10-CM

## 2018-04-27 DIAGNOSIS — N18.30 CONTROLLED TYPE 2 DIABETES MELLITUS WITH STAGE 3 CHRONIC KIDNEY DISEASE, WITHOUT LONG-TERM CURRENT USE OF INSULIN (HCC): Primary | ICD-10-CM

## 2018-04-27 DIAGNOSIS — E11.22 CONTROLLED TYPE 2 DIABETES MELLITUS WITH STAGE 3 CHRONIC KIDNEY DISEASE, WITHOUT LONG-TERM CURRENT USE OF INSULIN (HCC): Primary | ICD-10-CM

## 2018-04-27 DIAGNOSIS — I10 ESSENTIAL HYPERTENSION, BENIGN: ICD-10-CM

## 2018-04-27 DIAGNOSIS — N18.30 CKD (CHRONIC KIDNEY DISEASE) STAGE 3, GFR 30-59 ML/MIN (HCC): ICD-10-CM

## 2018-04-27 DIAGNOSIS — J30.1 CHRONIC SEASONAL ALLERGIC RHINITIS DUE TO POLLEN: ICD-10-CM

## 2018-04-27 DIAGNOSIS — E78.5 HYPERLIPIDEMIA WITH TARGET LDL LESS THAN 100: ICD-10-CM

## 2018-04-27 PROCEDURE — 2022F DILAT RTA XM EVC RTNOPTHY: CPT | Performed by: FAMILY MEDICINE

## 2018-04-27 PROCEDURE — 4040F PNEUMOC VAC/ADMIN/RCVD: CPT | Performed by: FAMILY MEDICINE

## 2018-04-27 PROCEDURE — G8427 DOCREV CUR MEDS BY ELIG CLIN: HCPCS | Performed by: FAMILY MEDICINE

## 2018-04-27 PROCEDURE — G8420 CALC BMI NORM PARAMETERS: HCPCS | Performed by: FAMILY MEDICINE

## 2018-04-27 PROCEDURE — 3046F HEMOGLOBIN A1C LEVEL >9.0%: CPT | Performed by: FAMILY MEDICINE

## 2018-04-27 PROCEDURE — 3017F COLORECTAL CA SCREEN DOC REV: CPT | Performed by: FAMILY MEDICINE

## 2018-04-27 PROCEDURE — 1123F ACP DISCUSS/DSCN MKR DOCD: CPT | Performed by: FAMILY MEDICINE

## 2018-04-27 PROCEDURE — 99214 OFFICE O/P EST MOD 30 MIN: CPT | Performed by: FAMILY MEDICINE

## 2018-04-27 PROCEDURE — 1036F TOBACCO NON-USER: CPT | Performed by: FAMILY MEDICINE

## 2018-04-27 RX ORDER — ONDANSETRON HYDROCHLORIDE 8 MG/1
TABLET, FILM COATED ORAL
Refills: 0 | COMMUNITY
Start: 2018-04-09 | End: 2018-06-22 | Stop reason: CLARIF

## 2018-04-27 RX ORDER — PROCHLORPERAZINE MALEATE 10 MG
TABLET ORAL
Refills: 3 | COMMUNITY
Start: 2018-04-10 | End: 2018-06-22 | Stop reason: CLARIF

## 2018-04-27 RX ORDER — HYDROCODONE BITARTRATE AND ACETAMINOPHEN 5; 325 MG/1; MG/1
TABLET ORAL
Refills: 0 | COMMUNITY
Start: 2018-04-16 | End: 2018-09-13 | Stop reason: CLARIF

## 2018-04-27 ASSESSMENT — ENCOUNTER SYMPTOMS
COUGH: 0
RECTAL PAIN: 0
NAUSEA: 0
CHEST TIGHTNESS: 0
BLOOD IN STOOL: 0
VOMITING: 0
ABDOMINAL DISTENTION: 0
CHOKING: 0
ABDOMINAL PAIN: 0
APNEA: 0
SHORTNESS OF BREATH: 0
ANAL BLEEDING: 0
DIARRHEA: 0
CONSTIPATION: 0
STRIDOR: 0
WHEEZING: 0

## 2018-05-04 ENCOUNTER — HOSPITAL ENCOUNTER (OUTPATIENT)
Dept: ONCOLOGY | Age: 73
Discharge: HOME | End: 2018-05-31
Attending: INTERNAL MEDICINE | Admitting: INTERNAL MEDICINE

## 2018-05-04 VITALS
RESPIRATION RATE: 18 BRPM | DIASTOLIC BLOOD PRESSURE: 73 MMHG | TEMPERATURE: 99.3 F | SYSTOLIC BLOOD PRESSURE: 124 MMHG | HEART RATE: 69 BPM

## 2018-05-04 LAB
ABO/RH: NORMAL
ABO/RH: NORMAL
ANTIBODY SCREEN: NORMAL
BLOOD BANK DISPENSE STATUS: NORMAL
BLOOD BANK PRODUCT CODE: NORMAL
BPU ID: NORMAL
DESCRIPTION BLOOD BANK: NORMAL

## 2018-05-04 RX ORDER — 0.9 % SODIUM CHLORIDE 0.9 %
250 INTRAVENOUS SOLUTION INTRAVENOUS ONCE
Status: COMPLETED | OUTPATIENT
Start: 2018-05-04 | End: 2018-05-05

## 2018-05-04 RX ORDER — HEPARIN SODIUM (PORCINE) LOCK FLUSH IV SOLN 100 UNIT/ML 100 UNIT/ML
300 SOLUTION INTRAVENOUS PRN
Status: DISCONTINUED | OUTPATIENT
Start: 2018-05-04 | End: 2018-05-06 | Stop reason: HOSPADM

## 2018-05-04 RX ADMIN — Medication 250 ML: at 13:00

## 2018-05-04 RX ADMIN — HEPARIN SODIUM (PORCINE) LOCK FLUSH IV SOLN 100 UNIT/ML 300 UNITS: 100 SOLUTION at 15:00

## 2018-05-18 DIAGNOSIS — E78.5 HYPERLIPIDEMIA WITH TARGET LDL LESS THAN 100: ICD-10-CM

## 2018-05-18 DIAGNOSIS — N18.30 CONTROLLED TYPE 2 DIABETES MELLITUS WITH STAGE 3 CHRONIC KIDNEY DISEASE, WITHOUT LONG-TERM CURRENT USE OF INSULIN (HCC): ICD-10-CM

## 2018-05-18 DIAGNOSIS — N18.30 CKD (CHRONIC KIDNEY DISEASE) STAGE 3, GFR 30-59 ML/MIN (HCC): ICD-10-CM

## 2018-05-18 DIAGNOSIS — E11.22 CONTROLLED TYPE 2 DIABETES MELLITUS WITH STAGE 3 CHRONIC KIDNEY DISEASE, WITHOUT LONG-TERM CURRENT USE OF INSULIN (HCC): ICD-10-CM

## 2018-05-18 DIAGNOSIS — I10 ESSENTIAL HYPERTENSION, BENIGN: ICD-10-CM

## 2018-05-18 LAB
A/G RATIO: 1.8 (ref 1.1–2.2)
ALBUMIN SERPL-MCNC: 3.2 G/DL (ref 3.4–5)
ALP BLD-CCNC: 62 U/L (ref 40–129)
ALT SERPL-CCNC: 7 U/L (ref 10–40)
ANION GAP SERPL CALCULATED.3IONS-SCNC: 13 MMOL/L (ref 3–16)
AST SERPL-CCNC: 16 U/L (ref 15–37)
BILIRUB SERPL-MCNC: <0.2 MG/DL (ref 0–1)
BUN BLDV-MCNC: 16 MG/DL (ref 7–20)
CALCIUM SERPL-MCNC: 8.2 MG/DL (ref 8.3–10.6)
CHLORIDE BLD-SCNC: 97 MMOL/L (ref 99–110)
CHOLESTEROL, TOTAL: 143 MG/DL (ref 0–199)
CO2: 28 MMOL/L (ref 21–32)
CREAT SERPL-MCNC: 1.1 MG/DL (ref 0.8–1.3)
CREATININE URINE: 118.2 MG/DL (ref 39–259)
GFR AFRICAN AMERICAN: >60
GFR NON-AFRICAN AMERICAN: >60
GLOBULIN: 1.8 G/DL
GLUCOSE BLD-MCNC: 93 MG/DL (ref 70–99)
HDLC SERPL-MCNC: 59 MG/DL (ref 40–60)
LDL CHOLESTEROL CALCULATED: 65 MG/DL
MICROALBUMIN UR-MCNC: <1.2 MG/DL
MICROALBUMIN/CREAT UR-RTO: NORMAL MG/G (ref 0–30)
POTASSIUM SERPL-SCNC: 4.2 MMOL/L (ref 3.5–5.1)
SODIUM BLD-SCNC: 138 MMOL/L (ref 136–145)
TOTAL PROTEIN: 5 G/DL (ref 6.4–8.2)
TRIGL SERPL-MCNC: 94 MG/DL (ref 0–150)
VLDLC SERPL CALC-MCNC: 19 MG/DL

## 2018-05-19 LAB
ESTIMATED AVERAGE GLUCOSE: 139.9 MG/DL
HBA1C MFR BLD: 6.5 %

## 2018-05-30 ENCOUNTER — CARE COORDINATION (OUTPATIENT)
Dept: CARE COORDINATION | Age: 73
End: 2018-05-30

## 2018-06-01 ENCOUNTER — HOSPITAL ENCOUNTER (OUTPATIENT)
Dept: ONCOLOGY | Age: 73
Discharge: HOME | End: 2018-06-30
Attending: INTERNAL MEDICINE | Admitting: INTERNAL MEDICINE

## 2018-06-04 ENCOUNTER — HOSPITAL ENCOUNTER (OUTPATIENT)
Dept: ONCOLOGY | Age: 73
Discharge: HOME | End: 2018-06-05
Attending: INTERNAL MEDICINE

## 2018-06-04 LAB
ABO/RH: NORMAL
ANTIBODY SCREEN: NORMAL

## 2018-06-04 RX ORDER — HEPARIN SODIUM (PORCINE) LOCK FLUSH IV SOLN 100 UNIT/ML 100 UNIT/ML
300 SOLUTION INTRAVENOUS PRN
Status: DISCONTINUED | OUTPATIENT
Start: 2018-06-04 | End: 2018-06-05 | Stop reason: SDUPTHER

## 2018-06-04 RX ADMIN — HEPARIN SODIUM (PORCINE) LOCK FLUSH IV SOLN 100 UNIT/ML 300 UNITS: 100 SOLUTION at 11:55

## 2018-06-05 ENCOUNTER — HOSPITAL ENCOUNTER (OUTPATIENT)
Dept: ONCOLOGY | Age: 73
Discharge: HOME | End: 2018-06-06
Attending: INTERNAL MEDICINE

## 2018-06-05 VITALS
RESPIRATION RATE: 18 BRPM | TEMPERATURE: 98.5 F | DIASTOLIC BLOOD PRESSURE: 69 MMHG | SYSTOLIC BLOOD PRESSURE: 111 MMHG | HEART RATE: 68 BPM

## 2018-06-05 LAB
BLOOD BANK DISPENSE STATUS: NORMAL
BLOOD BANK DISPENSE STATUS: NORMAL
BLOOD BANK PRODUCT CODE: NORMAL
BLOOD BANK PRODUCT CODE: NORMAL
BPU ID: NORMAL
BPU ID: NORMAL
DESCRIPTION BLOOD BANK: NORMAL
DESCRIPTION BLOOD BANK: NORMAL

## 2018-06-05 RX ORDER — HEPARIN SODIUM (PORCINE) LOCK FLUSH IV SOLN 100 UNIT/ML 100 UNIT/ML
300 SOLUTION INTRAVENOUS PRN
Status: DISCONTINUED | OUTPATIENT
Start: 2018-06-05 | End: 2018-06-07 | Stop reason: HOSPADM

## 2018-06-05 RX ADMIN — HEPARIN SODIUM (PORCINE) LOCK FLUSH IV SOLN 100 UNIT/ML 300 UNITS: 100 SOLUTION at 13:30

## 2018-06-23 PROBLEM — E43 SEVERE MALNUTRITION (HCC): Status: ACTIVE | Noted: 2018-06-23

## 2018-06-23 PROBLEM — E43 SEVERE MALNUTRITION (HCC): Chronic | Status: ACTIVE | Noted: 2018-06-23

## 2018-06-28 DIAGNOSIS — E78.5 HYPERLIPIDEMIA WITH TARGET LDL LESS THAN 100: ICD-10-CM

## 2018-06-28 RX ORDER — SIMVASTATIN 40 MG
TABLET ORAL
Qty: 90 TABLET | Refills: 0 | Status: SHIPPED | OUTPATIENT
Start: 2018-06-28

## 2018-07-01 ENCOUNTER — HOSPITAL ENCOUNTER (OUTPATIENT)
Dept: ONCOLOGY | Age: 73
Discharge: HOME | End: 2018-07-01
Attending: INTERNAL MEDICINE | Admitting: INTERNAL MEDICINE

## 2018-07-12 ENCOUNTER — HOSPITAL ENCOUNTER (OUTPATIENT)
Dept: ONCOLOGY | Age: 73
Discharge: HOME | End: 2018-07-13
Attending: INTERNAL MEDICINE

## 2018-07-12 LAB
ABO/RH: NORMAL
ANTIBODY SCREEN: NORMAL

## 2018-07-12 RX ORDER — HEPARIN SODIUM (PORCINE) LOCK FLUSH IV SOLN 100 UNIT/ML 100 UNIT/ML
300 SOLUTION INTRAVENOUS PRN
Status: DISCONTINUED | OUTPATIENT
Start: 2018-07-12 | End: 2018-07-14 | Stop reason: HOSPADM

## 2018-07-12 RX ADMIN — HEPARIN SODIUM (PORCINE) LOCK FLUSH IV SOLN 100 UNIT/ML 300 UNITS: 100 SOLUTION at 12:04

## 2018-07-13 ENCOUNTER — HOSPITAL ENCOUNTER (OUTPATIENT)
Dept: ONCOLOGY | Age: 73
Discharge: HOME | End: 2018-07-14
Attending: INTERNAL MEDICINE

## 2018-07-13 VITALS
DIASTOLIC BLOOD PRESSURE: 64 MMHG | RESPIRATION RATE: 18 BRPM | HEART RATE: 71 BPM | SYSTOLIC BLOOD PRESSURE: 110 MMHG | TEMPERATURE: 97.5 F

## 2018-07-13 LAB
BLOOD BANK DISPENSE STATUS: NORMAL
BLOOD BANK PRODUCT CODE: NORMAL
BPU ID: NORMAL
DESCRIPTION BLOOD BANK: NORMAL

## 2018-07-13 RX ORDER — HEPARIN SODIUM (PORCINE) LOCK FLUSH IV SOLN 100 UNIT/ML 100 UNIT/ML
300 SOLUTION INTRAVENOUS PRN
Status: DISCONTINUED | OUTPATIENT
Start: 2018-07-13 | End: 2018-07-15 | Stop reason: HOSPADM

## 2018-07-13 RX ORDER — ACETAMINOPHEN 325 MG/1
650 TABLET ORAL
Status: COMPLETED | OUTPATIENT
Start: 2018-07-13 | End: 2018-07-13

## 2018-07-13 RX ADMIN — HEPARIN SODIUM (PORCINE) LOCK FLUSH IV SOLN 100 UNIT/ML 300 UNITS: 100 SOLUTION at 14:05

## 2018-07-13 RX ADMIN — ACETAMINOPHEN 650 MG: 325 TABLET ORAL at 11:14

## 2018-07-13 ASSESSMENT — PAIN SCALES - GENERAL: PAINLEVEL_OUTOF10: 0

## 2018-07-20 ENCOUNTER — HOSPITAL ENCOUNTER (OUTPATIENT)
Dept: ONCOLOGY | Age: 73
Setting detail: INFUSION SERIES
Discharge: HOME | End: 2018-07-20
Payer: MEDICARE

## 2018-07-20 VITALS
TEMPERATURE: 97.5 F | DIASTOLIC BLOOD PRESSURE: 72 MMHG | RESPIRATION RATE: 18 BRPM | SYSTOLIC BLOOD PRESSURE: 116 MMHG | HEART RATE: 55 BPM

## 2018-07-20 PROCEDURE — 86900 BLOOD TYPING SEROLOGIC ABO: CPT

## 2018-07-20 PROCEDURE — 86850 RBC ANTIBODY SCREEN: CPT

## 2018-07-20 PROCEDURE — 86923 COMPATIBILITY TEST ELECTRIC: CPT

## 2018-07-20 PROCEDURE — 6360000002 HC RX W HCPCS: Performed by: INTERNAL MEDICINE

## 2018-07-20 PROCEDURE — P9016 RBC LEUKOCYTES REDUCED: HCPCS

## 2018-07-20 PROCEDURE — 86901 BLOOD TYPING SEROLOGIC RH(D): CPT

## 2018-07-20 PROCEDURE — 36430 TRANSFUSION BLD/BLD COMPNT: CPT

## 2018-07-20 PROCEDURE — P9035 PLATELET PHERES LEUKOREDUCED: HCPCS

## 2018-07-20 RX ORDER — 0.9 % SODIUM CHLORIDE 0.9 %
250 INTRAVENOUS SOLUTION INTRAVENOUS ONCE
Status: DISCONTINUED | OUTPATIENT
Start: 2018-07-20 | End: 2018-07-21 | Stop reason: HOSPADM

## 2018-07-20 RX ORDER — HEPARIN SODIUM (PORCINE) LOCK FLUSH IV SOLN 100 UNIT/ML 100 UNIT/ML
300 SOLUTION INTRAVENOUS PRN
Status: DISCONTINUED | OUTPATIENT
Start: 2018-07-20 | End: 2018-07-21 | Stop reason: HOSPADM

## 2018-07-20 RX ORDER — BLOOD SUGAR DIAGNOSTIC
STRIP MISCELLANEOUS
Qty: 200 STRIP | Refills: 3 | Status: SHIPPED | OUTPATIENT
Start: 2018-07-20

## 2018-07-20 RX ORDER — LANCETS
EACH MISCELLANEOUS
Qty: 204 EACH | Refills: 3 | Status: SHIPPED | OUTPATIENT
Start: 2018-07-20

## 2018-07-20 RX ADMIN — Medication 300 UNITS: at 16:42

## 2018-07-20 NOTE — PLAN OF CARE
Problem: Falls - Risk of:  Goal: Will remain free from falls  Will remain free from falls  Outcome: Met This Shift  Pt is a High fall risk. Explained fall risk precautions to pt & wife and rationale behind their use to keep the patient safe. Belongings are in reach. Pt encouraged to notify staff for any and all assistance. Staff present in tx suite throughout entirety of pts treatment to monitor and protect from falls. Assistance provided when ambulating to restroom utilizing Stay With Me.

## 2018-07-20 NOTE — PLAN OF CARE
Problem: KNOWLEDGE DEFICIT  Goal: Patient/S.O. demonstrates understanding of disease process, treatment plan, medications, and discharge instructions. Outcome: Met This Shift  Patient's hemoglobin this AM: 5.9  Patient's platelet count this AM: 15  Pt seen and assessed at [facility]. Seen at [facility] today for two units of PRBC and one unit of platelets per standing orders for above lab values. Blood products transfused per [facility] policy. Pt tolerated transfusion well and without incident. Pt verbalizes understanding of discharge instructions. Discharged via wheelchair to home with wife.

## 2018-07-24 ENCOUNTER — HOSPITAL ENCOUNTER (OUTPATIENT)
Dept: ONCOLOGY | Age: 73
Setting detail: INFUSION SERIES
Discharge: HOME | End: 2018-07-24
Payer: MEDICARE

## 2018-07-24 VITALS
SYSTOLIC BLOOD PRESSURE: 109 MMHG | TEMPERATURE: 98.2 F | RESPIRATION RATE: 16 BRPM | DIASTOLIC BLOOD PRESSURE: 63 MMHG | HEART RATE: 68 BPM

## 2018-07-24 PROCEDURE — P9035 PLATELET PHERES LEUKOREDUCED: HCPCS

## 2018-07-24 PROCEDURE — 36430 TRANSFUSION BLD/BLD COMPNT: CPT

## 2018-07-24 PROCEDURE — 6360000002 HC RX W HCPCS: Performed by: INTERNAL MEDICINE

## 2018-07-24 PROCEDURE — 6370000000 HC RX 637 (ALT 250 FOR IP): Performed by: INTERNAL MEDICINE

## 2018-07-24 RX ORDER — ACETAMINOPHEN 325 MG/1
650 TABLET ORAL
Status: COMPLETED | OUTPATIENT
Start: 2018-07-24 | End: 2018-07-24

## 2018-07-24 RX ORDER — HEPARIN SODIUM (PORCINE) LOCK FLUSH IV SOLN 100 UNIT/ML 100 UNIT/ML
300 SOLUTION INTRAVENOUS PRN
Status: DISCONTINUED | OUTPATIENT
Start: 2018-07-24 | End: 2018-07-25 | Stop reason: HOSPADM

## 2018-07-24 RX ADMIN — Medication 300 UNITS: at 14:55

## 2018-07-24 RX ADMIN — ACETAMINOPHEN 650 MG: 325 TABLET ORAL at 14:01

## 2018-07-24 ASSESSMENT — PAIN SCALES - GENERAL: PAINLEVEL_OUTOF10: 0

## 2018-07-24 NOTE — PLAN OF CARE
Problem: KNOWLEDGE DEFICIT  Goal: Patient/S.O. demonstrates understanding of disease process, treatment plan, medications, and discharge instructions. Outcome: Ongoing  Patient's hemoglobin this AM: 8.4  Patient's platelet count this AM: 1.0  Pt seen and assessed at [facility]. Seen at [address] [place] [address] today for 1 unit of  SDP per standing orders for above lab values. Blood products transfused per [facility] policy. Pt tolerated transfusion well and without incident. No s/s of bleeding noted or reported by pt. Pt verbalizes understanding of discharge instructions. Discharged ambulatory to home with wife.

## 2018-08-27 ENCOUNTER — HOSPITAL ENCOUNTER (OUTPATIENT)
Dept: ONCOLOGY | Age: 73
Setting detail: INFUSION SERIES
Discharge: HOME | End: 2018-08-27
Payer: MEDICARE

## 2018-08-27 VITALS
HEART RATE: 79 BPM | SYSTOLIC BLOOD PRESSURE: 122 MMHG | RESPIRATION RATE: 18 BRPM | DIASTOLIC BLOOD PRESSURE: 75 MMHG | TEMPERATURE: 97.1 F

## 2018-08-27 LAB
ABO/RH: NORMAL
ANTIBODY SCREEN: NORMAL
BLOOD BANK DISPENSE STATUS: NORMAL
BLOOD BANK DISPENSE STATUS: NORMAL
BLOOD BANK PRODUCT CODE: NORMAL
BLOOD BANK PRODUCT CODE: NORMAL
BPU ID: NORMAL
BPU ID: NORMAL
DESCRIPTION BLOOD BANK: NORMAL
DESCRIPTION BLOOD BANK: NORMAL

## 2018-08-27 PROCEDURE — P9016 RBC LEUKOCYTES REDUCED: HCPCS

## 2018-08-27 PROCEDURE — 86923 COMPATIBILITY TEST ELECTRIC: CPT

## 2018-08-27 PROCEDURE — 86900 BLOOD TYPING SEROLOGIC ABO: CPT

## 2018-08-27 PROCEDURE — 36430 TRANSFUSION BLD/BLD COMPNT: CPT

## 2018-08-27 PROCEDURE — 6360000002 HC RX W HCPCS: Performed by: INTERNAL MEDICINE

## 2018-08-27 PROCEDURE — 36591 DRAW BLOOD OFF VENOUS DEVICE: CPT

## 2018-08-27 PROCEDURE — 86901 BLOOD TYPING SEROLOGIC RH(D): CPT

## 2018-08-27 PROCEDURE — 86850 RBC ANTIBODY SCREEN: CPT

## 2018-08-27 RX ORDER — HEPARIN SODIUM (PORCINE) LOCK FLUSH IV SOLN 100 UNIT/ML 100 UNIT/ML
300 SOLUTION INTRAVENOUS PRN
Status: DISCONTINUED | OUTPATIENT
Start: 2018-08-27 | End: 2018-08-28 | Stop reason: HOSPADM

## 2018-08-27 RX ADMIN — Medication 300 UNITS: at 14:43

## 2018-09-13 ENCOUNTER — OFFICE VISIT (OUTPATIENT)
Dept: FAMILY MEDICINE CLINIC | Age: 73
End: 2018-09-13

## 2018-09-13 VITALS
HEART RATE: 89 BPM | RESPIRATION RATE: 16 BRPM | BODY MASS INDEX: 23.5 KG/M2 | OXYGEN SATURATION: 98 % | HEIGHT: 66 IN | WEIGHT: 146.2 LBS | SYSTOLIC BLOOD PRESSURE: 118 MMHG | TEMPERATURE: 99 F | DIASTOLIC BLOOD PRESSURE: 72 MMHG

## 2018-09-13 DIAGNOSIS — N18.30 CONTROLLED TYPE 2 DIABETES MELLITUS WITH STAGE 3 CHRONIC KIDNEY DISEASE, WITHOUT LONG-TERM CURRENT USE OF INSULIN (HCC): ICD-10-CM

## 2018-09-13 DIAGNOSIS — Z23 INFLUENZA VACCINE NEEDED: ICD-10-CM

## 2018-09-13 DIAGNOSIS — N18.30 CKD (CHRONIC KIDNEY DISEASE) STAGE 3, GFR 30-59 ML/MIN (HCC): ICD-10-CM

## 2018-09-13 DIAGNOSIS — E11.22 CONTROLLED TYPE 2 DIABETES MELLITUS WITH STAGE 3 CHRONIC KIDNEY DISEASE, WITHOUT LONG-TERM CURRENT USE OF INSULIN (HCC): ICD-10-CM

## 2018-09-13 DIAGNOSIS — E78.5 HYPERLIPIDEMIA WITH TARGET LDL LESS THAN 100: ICD-10-CM

## 2018-09-13 DIAGNOSIS — C85.83 LARGE CELL LYMPHOMA OF INTRA-ABDOMINAL LYMPH NODES (HCC): ICD-10-CM

## 2018-09-13 DIAGNOSIS — J30.1 SEASONAL ALLERGIC RHINITIS DUE TO POLLEN: ICD-10-CM

## 2018-09-13 DIAGNOSIS — I10 ESSENTIAL HYPERTENSION, BENIGN: Primary | ICD-10-CM

## 2018-09-13 DIAGNOSIS — C45.9 MESOTHELIOMA (HCC): ICD-10-CM

## 2018-09-13 PROCEDURE — 2022F DILAT RTA XM EVC RTNOPTHY: CPT | Performed by: FAMILY MEDICINE

## 2018-09-13 PROCEDURE — 3044F HG A1C LEVEL LT 7.0%: CPT | Performed by: FAMILY MEDICINE

## 2018-09-13 PROCEDURE — G8427 DOCREV CUR MEDS BY ELIG CLIN: HCPCS | Performed by: FAMILY MEDICINE

## 2018-09-13 PROCEDURE — 1123F ACP DISCUSS/DSCN MKR DOCD: CPT | Performed by: FAMILY MEDICINE

## 2018-09-13 PROCEDURE — 1101F PT FALLS ASSESS-DOCD LE1/YR: CPT | Performed by: FAMILY MEDICINE

## 2018-09-13 PROCEDURE — 3017F COLORECTAL CA SCREEN DOC REV: CPT | Performed by: FAMILY MEDICINE

## 2018-09-13 PROCEDURE — 4040F PNEUMOC VAC/ADMIN/RCVD: CPT | Performed by: FAMILY MEDICINE

## 2018-09-13 PROCEDURE — G8420 CALC BMI NORM PARAMETERS: HCPCS | Performed by: FAMILY MEDICINE

## 2018-09-13 PROCEDURE — 1036F TOBACCO NON-USER: CPT | Performed by: FAMILY MEDICINE

## 2018-09-13 PROCEDURE — 99214 OFFICE O/P EST MOD 30 MIN: CPT | Performed by: FAMILY MEDICINE

## 2018-09-13 RX ORDER — MORPHINE SULFATE 30 MG/1
30 TABLET, FILM COATED, EXTENDED RELEASE ORAL 2 TIMES DAILY
COMMUNITY
Start: 2018-09-12

## 2018-09-13 ASSESSMENT — ENCOUNTER SYMPTOMS
ANAL BLEEDING: 0
ABDOMINAL DISTENTION: 0
RECTAL PAIN: 0
NAUSEA: 0
ABDOMINAL PAIN: 0
CHEST TIGHTNESS: 0
WHEEZING: 0
SHORTNESS OF BREATH: 0
VOMITING: 0
CONSTIPATION: 0
CHOKING: 0
BLOOD IN STOOL: 0
APNEA: 0
COUGH: 0
DIARRHEA: 0
STRIDOR: 0

## 2018-09-13 ASSESSMENT — PATIENT HEALTH QUESTIONNAIRE - PHQ9
SUM OF ALL RESPONSES TO PHQ QUESTIONS 1-9: 0
SUM OF ALL RESPONSES TO PHQ9 QUESTIONS 1 & 2: 0
2. FEELING DOWN, DEPRESSED OR HOPELESS: 0
1. LITTLE INTEREST OR PLEASURE IN DOING THINGS: 0
SUM OF ALL RESPONSES TO PHQ QUESTIONS 1-9: 0
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
## 2018-10-04 ENCOUNTER — HOSPITAL ENCOUNTER (OUTPATIENT)
Dept: CT IMAGING | Age: 73
Discharge: HOME | End: 2018-10-04
Payer: MEDICARE

## 2018-10-04 DIAGNOSIS — R47.81 SLURRED SPEECH: ICD-10-CM

## 2018-10-04 DIAGNOSIS — C45.0 MESOTHELIOMA (PLEURAL) (HCC): ICD-10-CM

## 2018-10-04 PROCEDURE — 6360000004 HC RX CONTRAST MEDICATION: Performed by: NURSE PRACTITIONER

## 2018-10-04 PROCEDURE — 70460 CT HEAD/BRAIN W/DYE: CPT

## 2018-10-04 RX ADMIN — IOPAMIDOL 80 ML: 755 INJECTION, SOLUTION INTRAVENOUS at 15:21

## 2018-10-16 ENCOUNTER — APPOINTMENT (OUTPATIENT)
Dept: GENERAL RADIOLOGY | Age: 73
DRG: 843 | End: 2018-10-16
Payer: MEDICARE

## 2018-10-16 ENCOUNTER — APPOINTMENT (OUTPATIENT)
Dept: CT IMAGING | Age: 73
DRG: 843 | End: 2018-10-16
Payer: MEDICARE

## 2018-10-16 ENCOUNTER — HOSPITAL ENCOUNTER (INPATIENT)
Age: 73
LOS: 8 days | Discharge: HOSPICE | DRG: 843 | End: 2018-10-24
Attending: EMERGENCY MEDICINE | Admitting: INTERNAL MEDICINE
Payer: MEDICARE

## 2018-10-16 DIAGNOSIS — J18.9 PNEUMONIA DUE TO ORGANISM: Primary | ICD-10-CM

## 2018-10-16 DIAGNOSIS — E87.70 HYPERVOLEMIA, UNSPECIFIED HYPERVOLEMIA TYPE: ICD-10-CM
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
## 2018-10-16 LAB
ALBUMIN SERPL-MCNC: 2.8 G/DL (ref 3.4–5)
ALP BLD-CCNC: 86 U/L (ref 40–129)
ALT SERPL-CCNC: 14 U/L (ref 10–40)
ANION GAP SERPL CALCULATED.3IONS-SCNC: 18 MMOL/L (ref 3–16)
ANISOCYTOSIS: ABNORMAL
AST SERPL-CCNC: 42 U/L (ref 15–37)
B-TYPE NATRIURETIC PEPTIDE: 672 PG/ML (ref 0–99.9)
BACTERIA: ABNORMAL /HPF
BANDED NEUTROPHILS RELATIVE PERCENT: 5 % (ref 0–7)
BASE EXCESS VENOUS: -3 (ref -3–3)
BASOPHILS ABSOLUTE: 0 K/UL (ref 0–0.2)
BASOPHILS RELATIVE PERCENT: 0 %
BILIRUB SERPL-MCNC: 0.4 MG/DL (ref 0–1)
BILIRUBIN DIRECT: <0.2 MG/DL (ref 0–0.3)
BILIRUBIN URINE: NEGATIVE
BILIRUBIN, INDIRECT: ABNORMAL MG/DL (ref 0–1)
BLOOD, URINE: ABNORMAL
BUN BLDV-MCNC: 60 MG/DL (ref 7–20)
BURR CELLS: ABNORMAL
CALCIUM IONIZED: 1.13 MMOL/L (ref 1.12–1.32)
CALCIUM SERPL-MCNC: 8.4 MG/DL (ref 8.3–10.6)
CASTS: ABNORMAL /LPF
CHLORIDE BLD-SCNC: 96 MMOL/L (ref 99–110)
CLARITY: ABNORMAL
CO2: 22 MMOL/L (ref 21–32)
CO2: 25 MMOL/L (ref 21–32)
COLOR: YELLOW
CREAT SERPL-MCNC: 3.5 MG/DL (ref 0.8–1.3)
EOSINOPHILS ABSOLUTE: 0 K/UL (ref 0–0.6)
EOSINOPHILS RELATIVE PERCENT: 0 %
EPITHELIAL CELLS, UA: ABNORMAL /HPF
GFR AFRICAN AMERICAN: 19
GFR AFRICAN AMERICAN: 21
GFR NON-AFRICAN AMERICAN: 16
GFR NON-AFRICAN AMERICAN: 17
GLUCOSE BLD-MCNC: 102 MG/DL (ref 70–99)
GLUCOSE BLD-MCNC: 108 MG/DL (ref 70–99)
GLUCOSE URINE: NEGATIVE MG/DL
HCO3 VENOUS: 23.4 MMOL/L (ref 23–29)
HCT VFR BLD CALC: 25 % (ref 40.5–52.5)
HEMOGLOBIN: 8.1 G/DL (ref 13.5–17.5)
KETONES, URINE: NEGATIVE MG/DL
LACTATE: 1.6 MMOL/L (ref 0.4–2)
LACTIC ACID: 0.9 MMOL/L (ref 0.4–2)
LEUKOCYTE ESTERASE, URINE: ABNORMAL
LIPASE: 6 U/L (ref 13–60)
LYMPHOCYTES ABSOLUTE: 0.2 K/UL (ref 1–5.1)
LYMPHOCYTES RELATIVE PERCENT: 1 %
MCH RBC QN AUTO: 31.4 PG (ref 26–34)
MCHC RBC AUTO-ENTMCNC: 32.5 G/DL (ref 31–36)
MCV RBC AUTO: 96.6 FL (ref 80–100)
METAMYELOCYTES RELATIVE PERCENT: 1 %
MICROSCOPIC EXAMINATION: YES
MONOCYTES ABSOLUTE: 0.2 K/UL (ref 0–1.3)
MONOCYTES RELATIVE PERCENT: 1 %
NEUTROPHILS ABSOLUTE: 16.1 K/UL (ref 1.7–7.7)
NEUTROPHILS RELATIVE PERCENT: 92 %
NITRITE, URINE: NEGATIVE
O2 SAT, VEN: 68 %
PCO2, VEN: 44.4 MM HG (ref 40–50)
PDW BLD-RTO: 17.1 % (ref 12.4–15.4)
PERFORMED ON: ABNORMAL
PERFORMED ON: NORMAL
PH UA: 6
PH VENOUS: 7.33 (ref 7.35–7.45)
PLATELET # BLD: 126 K/UL (ref 135–450)
PMV BLD AUTO: 8.9 FL (ref 5–10.5)
PO2, VEN: 38 MM HG
POC ANION GAP: 12 (ref 10–20)
POC BUN: 58 MG/DL (ref 7–18)
POC CHLORIDE: 98 MMOL/L (ref 99–110)
POC CREATININE: 3.8 MG/DL (ref 0.8–1.3)
POC POTASSIUM: 4.1 MMOL/L (ref 3.5–5.1)
POC SAMPLE TYPE: ABNORMAL
POC SAMPLE TYPE: NORMAL
POC SODIUM: 135 MMOL/L (ref 136–145)
POC TROPONIN I: 0.04 NG/ML (ref 0–0.1)
POTASSIUM REFLEX MAGNESIUM: 4.2 MMOL/L (ref 3.5–5.1)
PRO-BNP: ABNORMAL PG/ML (ref 0–124)
PROTEIN UA: NEGATIVE MG/DL
RBC # BLD: 2.59 M/UL (ref 4.2–5.9)
RBC UA: ABNORMAL /HPF (ref 0–2)
SODIUM BLD-SCNC: 136 MMOL/L (ref 136–145)
SPECIFIC GRAVITY UA: 1.02
TCO2 CALC VENOUS: 25 MMOL/L
TEAR DROP CELLS: ABNORMAL
TOTAL PROTEIN: 4.6 G/DL (ref 6.4–8.2)
TROPONIN: 0.1 NG/ML
URINE TYPE: ABNORMAL
UROBILINOGEN, URINE: 0.2 E.U./DL
WBC # BLD: 16.4 K/UL (ref 4–11)
WBC UA: ABNORMAL /HPF (ref 0–5)

## 2018-10-16 PROCEDURE — 94760 N-INVAS EAR/PLS OXIMETRY 1: CPT

## 2018-10-16 PROCEDURE — 72125 CT NECK SPINE W/O DYE: CPT

## 2018-10-16 PROCEDURE — 6360000002 HC RX W HCPCS: Performed by: PHYSICIAN ASSISTANT

## 2018-10-16 PROCEDURE — 99285 EMERGENCY DEPT VISIT HI MDM: CPT

## 2018-10-16 PROCEDURE — 87486 CHLMYD PNEUM DNA AMP PROBE: CPT

## 2018-10-16 PROCEDURE — 36415 COLL VENOUS BLD VENIPUNCTURE: CPT

## 2018-10-16 PROCEDURE — 6360000002 HC RX W HCPCS: Performed by: STUDENT IN AN ORGANIZED HEALTH CARE EDUCATION/TRAINING PROGRAM

## 2018-10-16 PROCEDURE — 94664 DEMO&/EVAL PT USE INHALER: CPT

## 2018-10-16 PROCEDURE — 87798 DETECT AGENT NOS DNA AMP: CPT

## 2018-10-16 PROCEDURE — 96366 THER/PROPH/DIAG IV INF ADDON: CPT

## 2018-10-16 PROCEDURE — 84145 PROCALCITONIN (PCT): CPT

## 2018-10-16 PROCEDURE — 83880 ASSAY OF NATRIURETIC PEPTIDE: CPT

## 2018-10-16 PROCEDURE — 84484 ASSAY OF TROPONIN QUANT: CPT

## 2018-10-16 PROCEDURE — 85025 COMPLETE CBC W/AUTO DIFF WBC: CPT

## 2018-10-16 PROCEDURE — 70450 CT HEAD/BRAIN W/O DYE: CPT

## 2018-10-16 PROCEDURE — 80047 BASIC METABLC PNL IONIZED CA: CPT

## 2018-10-16 PROCEDURE — 82803 BLOOD GASES ANY COMBINATION: CPT

## 2018-10-16 PROCEDURE — 96367 TX/PROPH/DG ADDL SEQ IV INF: CPT

## 2018-10-16 PROCEDURE — 2580000003 HC RX 258: Performed by: STUDENT IN AN ORGANIZED HEALTH CARE EDUCATION/TRAINING PROGRAM

## 2018-10-16 PROCEDURE — 2700000000 HC OXYGEN THERAPY PER DAY

## 2018-10-16 PROCEDURE — 87086 URINE CULTURE/COLONY COUNT: CPT

## 2018-10-16 PROCEDURE — 80076 HEPATIC FUNCTION PANEL: CPT

## 2018-10-16 PROCEDURE — 93005 ELECTROCARDIOGRAM TRACING: CPT | Performed by: PHYSICIAN ASSISTANT

## 2018-10-16 PROCEDURE — 83605 ASSAY OF LACTIC ACID: CPT

## 2018-10-16 PROCEDURE — 2580000003 HC RX 258: Performed by: PHYSICIAN ASSISTANT

## 2018-10-16 PROCEDURE — 87040 BLOOD CULTURE FOR BACTERIA: CPT

## 2018-10-16 PROCEDURE — 96375 TX/PRO/DX INJ NEW DRUG ADDON: CPT

## 2018-10-16 PROCEDURE — 6370000000 HC RX 637 (ALT 250 FOR IP): Performed by: STUDENT IN AN ORGANIZED HEALTH CARE EDUCATION/TRAINING PROGRAM

## 2018-10-16 PROCEDURE — 87581 M.PNEUMON DNA AMP PROBE: CPT

## 2018-10-16 PROCEDURE — 96365 THER/PROPH/DIAG IV INF INIT: CPT

## 2018-10-16 PROCEDURE — 83690 ASSAY OF LIPASE: CPT

## 2018-10-16 PROCEDURE — 71250 CT THORAX DX C-: CPT

## 2018-10-16 PROCEDURE — 71046 X-RAY EXAM CHEST 2 VIEWS: CPT

## 2018-10-16 PROCEDURE — 87633 RESP VIRUS 12-25 TARGETS: CPT

## 2018-10-16 PROCEDURE — 87077 CULTURE AEROBIC IDENTIFY: CPT

## 2018-10-16 PROCEDURE — 2060000000 HC ICU INTERMEDIATE R&B

## 2018-10-16 PROCEDURE — 81001 URINALYSIS AUTO W/SCOPE: CPT

## 2018-10-16 PROCEDURE — 87186 SC STD MICRODIL/AGAR DIL: CPT

## 2018-10-16 RX ORDER — M-VIT,TX,IRON,MINS/CALC/FOLIC 27MG-0.4MG
1 TABLET ORAL DAILY
Status: DISCONTINUED | OUTPATIENT
Start: 2018-10-17 | End: 2018-10-24 | Stop reason: HOSPADM

## 2018-10-16 RX ORDER — FERROUS SULFATE 325(65) MG
325 TABLET ORAL
Status: DISCONTINUED | OUTPATIENT
Start: 2018-10-17 | End: 2018-10-24 | Stop reason: HOSPADM

## 2018-10-16 RX ORDER — CALCIUM CARBONATE 500(1250)
500 TABLET ORAL DAILY
Status: DISCONTINUED | OUTPATIENT
Start: 2018-10-17 | End: 2018-10-24 | Stop reason: HOSPADM

## 2018-10-16 RX ORDER — ACETAMINOPHEN 500 MG
500 TABLET ORAL EVERY 6 HOURS PRN
Status: DISCONTINUED | OUTPATIENT
Start: 2018-10-16 | End: 2018-10-24 | Stop reason: HOSPADM

## 2018-10-16 RX ORDER — DEXTROSE MONOHYDRATE 50 MG/ML
100 INJECTION, SOLUTION INTRAVENOUS PRN
Status: DISCONTINUED | OUTPATIENT
Start: 2018-10-16 | End: 2018-10-24 | Stop reason: HOSPADM

## 2018-10-16 RX ORDER — LEVOFLOXACIN 5 MG/ML
750 INJECTION, SOLUTION INTRAVENOUS EVERY 24 HOURS
Status: DISCONTINUED | OUTPATIENT
Start: 2018-10-17 | End: 2018-10-16 | Stop reason: DRUGHIGH

## 2018-10-16 RX ORDER — LEVOFLOXACIN 5 MG/ML
750 INJECTION, SOLUTION INTRAVENOUS ONCE
Status: COMPLETED | OUTPATIENT
Start: 2018-10-16 | End: 2018-10-17

## 2018-10-16 RX ORDER — HEPARIN SODIUM 5000 [USP'U]/ML
5000 INJECTION, SOLUTION INTRAVENOUS; SUBCUTANEOUS EVERY 8 HOURS
Status: DISCONTINUED | OUTPATIENT
Start: 2018-10-16 | End: 2018-10-24 | Stop reason: HOSPADM

## 2018-10-16 RX ORDER — AZITHROMYCIN 250 MG/1
250 TABLET, FILM COATED ORAL DAILY
Status: DISCONTINUED | OUTPATIENT
Start: 2018-10-17 | End: 2018-10-17

## 2018-10-16 RX ORDER — SIMVASTATIN 40 MG
40 TABLET ORAL NIGHTLY
Status: DISCONTINUED | OUTPATIENT
Start: 2018-10-16 | End: 2018-10-24 | Stop reason: HOSPADM

## 2018-10-16 RX ORDER — NICOTINE POLACRILEX 4 MG
15 LOZENGE BUCCAL PRN
Status: DISCONTINUED | OUTPATIENT
Start: 2018-10-16 | End: 2018-10-24 | Stop reason: HOSPADM

## 2018-10-16 RX ORDER — LEVOFLOXACIN 5 MG/ML
500 INJECTION, SOLUTION INTRAVENOUS
Status: COMPLETED | OUTPATIENT
Start: 2018-10-18 | End: 2018-10-23

## 2018-10-16 RX ORDER — ONDANSETRON 2 MG/ML
4 INJECTION INTRAMUSCULAR; INTRAVENOUS EVERY 6 HOURS PRN
Status: DISCONTINUED | OUTPATIENT
Start: 2018-10-16 | End: 2018-10-24 | Stop reason: HOSPADM

## 2018-10-16 RX ORDER — MORPHINE SULFATE 30 MG/1
30 TABLET, FILM COATED, EXTENDED RELEASE ORAL 2 TIMES DAILY
Status: DISCONTINUED | OUTPATIENT
Start: 2018-10-16 | End: 2018-10-24 | Stop reason: HOSPADM

## 2018-10-16 RX ORDER — SODIUM CHLORIDE 0.9 % (FLUSH) 0.9 %
10 SYRINGE (ML) INJECTION EVERY 12 HOURS SCHEDULED
Status: DISCONTINUED | OUTPATIENT
Start: 2018-10-16 | End: 2018-10-24 | Stop reason: HOSPADM

## 2018-10-16 RX ORDER — PROCHLORPERAZINE MALEATE 10 MG
10 TABLET ORAL EVERY 6 HOURS PRN
Status: DISCONTINUED | OUTPATIENT
Start: 2018-10-16 | End: 2018-10-24 | Stop reason: HOSPADM

## 2018-10-16 RX ORDER — DEXTROSE MONOHYDRATE 25 G/50ML
12.5 INJECTION, SOLUTION INTRAVENOUS PRN
Status: DISCONTINUED | OUTPATIENT
Start: 2018-10-16 | End: 2018-10-24 | Stop reason: HOSPADM

## 2018-10-16 RX ORDER — FUROSEMIDE 10 MG/ML
20 INJECTION INTRAMUSCULAR; INTRAVENOUS ONCE
Status: COMPLETED | OUTPATIENT
Start: 2018-10-16 | End: 2018-10-16

## 2018-10-16 RX ORDER — SODIUM CHLORIDE 0.9 % (FLUSH) 0.9 %
10 SYRINGE (ML) INJECTION PRN
Status: DISCONTINUED | OUTPATIENT
Start: 2018-10-16 | End: 2018-10-24 | Stop reason: HOSPADM

## 2018-10-16 RX ORDER — FLUTICASONE PROPIONATE 50 MCG
2 SPRAY, SUSPENSION (ML) NASAL DAILY PRN
Status: DISCONTINUED | OUTPATIENT
Start: 2018-10-16 | End: 2018-10-24 | Stop reason: HOSPADM

## 2018-10-16 RX ORDER — SENNA AND DOCUSATE SODIUM 50; 8.6 MG/1; MG/1
1 TABLET, FILM COATED ORAL EVERY 12 HOURS
Status: DISCONTINUED | OUTPATIENT
Start: 2018-10-16 | End: 2018-10-24 | Stop reason: HOSPADM

## 2018-10-16 RX ORDER — ONDANSETRON HYDROCHLORIDE 8 MG/1
8 TABLET, FILM COATED ORAL EVERY 8 HOURS PRN
Status: DISCONTINUED | OUTPATIENT
Start: 2018-10-16 | End: 2018-10-24 | Stop reason: HOSPADM

## 2018-10-16 RX ADMIN — FUROSEMIDE 20 MG: 10 INJECTION, SOLUTION INTRAMUSCULAR; INTRAVENOUS at 15:32

## 2018-10-16 RX ADMIN — PIPERACILLIN SODIUM,TAZOBACTAM SODIUM 3.38 G: 3; .375 INJECTION, POWDER, FOR SOLUTION INTRAVENOUS at 15:32

## 2018-10-16 RX ADMIN — SIMVASTATIN 40 MG: 40 TABLET, FILM COATED ORAL at 22:24

## 2018-10-16 RX ADMIN — Medication 10 ML: at 22:24

## 2018-10-16 RX ADMIN — HEPARIN SODIUM 5000 UNITS: 5000 INJECTION INTRAVENOUS; SUBCUTANEOUS at 22:25

## 2018-10-16 RX ADMIN — AZITHROMYCIN MONOHYDRATE 500 MG: 500 INJECTION, POWDER, LYOPHILIZED, FOR SOLUTION INTRAVENOUS at 23:22

## 2018-10-16 RX ADMIN — VANCOMYCIN HYDROCHLORIDE 1000 MG: 10 INJECTION, POWDER, LYOPHILIZED, FOR SOLUTION INTRAVENOUS at 17:24

## 2018-10-16 RX ADMIN — SENNOSIDES AND DOCUSATE SODIUM 1 TABLET: 8.6; 5 TABLET ORAL at 22:24

## 2018-10-16 RX ADMIN — MORPHINE SULFATE 30 MG: 30 TABLET, FILM COATED, EXTENDED RELEASE ORAL at 22:23

## 2018-10-16 ASSESSMENT — PAIN SCALES - GENERAL
PAINLEVEL_OUTOF10: 6
PAINLEVEL_OUTOF10: 0

## 2018-10-16 ASSESSMENT — PAIN DESCRIPTION - ORIENTATION
ORIENTATION: LEFT
ORIENTATION: LOWER

## 2018-10-16 ASSESSMENT — PAIN DESCRIPTION - LOCATION
LOCATION: BACK
LOCATION: KNEE

## 2018-10-16 ASSESSMENT — PAIN DESCRIPTION - PAIN TYPE: TYPE: CHRONIC PAIN

## 2018-10-16 ASSESSMENT — PAIN DESCRIPTION - FREQUENCY: FREQUENCY: INTERMITTENT

## 2018-10-16 ASSESSMENT — ACTIVITIES OF DAILY LIVING (ADL): EFFECT OF PAIN ON DAILY ACTIVITIES: REST

## 2018-10-16 ASSESSMENT — PAIN DESCRIPTION - PROGRESSION: CLINICAL_PROGRESSION: NOT CHANGED

## 2018-10-16 ASSESSMENT — PAIN DESCRIPTION - DESCRIPTORS: DESCRIPTORS: DISCOMFORT

## 2018-10-16 ASSESSMENT — PAIN DESCRIPTION - ONSET: ONSET: ON-GOING

## 2018-10-16 NOTE — ED NOTES
Bed: B24-24  Expected date: 10/16/18  Expected time: 1:39 PM  Means of arrival:   Comments:  [facility], [facility] pt weakness/fall     Andrew Banda RN  10/16/18 1568

## 2018-10-16 NOTE — ED PROVIDER NOTES
ED Attending Attestation Note     Date of evaluation: 10/16/2018    This patient was seen by the advance practice provider. I have seen and examined the patient, agree with the workup, evaluation, management and diagnosis. The care plan has been discussed. I have reviewed the ECG and concur with the SIOBHAN's interpretation. My assessment reveals Who presents after getting fluids at [facility] today and falling into flower garden. He was weak this morning. He couldn't get off of the toilet. Family had a call 911 and EMTs got him up and he was able to make it to a Car Where he was driven by wife to [facility] where he received fluids. Leaving there today he fell secondary to weakness. No loss of consciousness. Did not hit head. Patient just feels weak and has no focal complaints. On exam abdomen was soft. He does have 2+ pitting edema of lower extremities which is greater than normal per wife.      Tanmay Hancock MD  10/16/18 1400 Carlos Downing MD  10/16/18 3137

## 2018-10-16 NOTE — ED PROVIDER NOTES
MCH 31.4 26.0 - 34.0 pg    MCHC 32.5 31.0 - 36.0 g/dL    RDW 17.1 (H) 12.4 - 15.4 %    Platelets 579 (L) 484 - 450 K/uL    MPV 8.9 5.0 - 10.5 fL    Neutrophils % 92.0 %    Lymphocytes % 1.0 %    Monocytes % 1.0 %    Eosinophils % 0.0 %    Basophils % 0.0 %    Neutrophils # 16.1 (H) 1.7 - 7.7 K/uL    Lymphocytes # 0.2 (L) 1.0 - 5.1 K/uL    Monocytes # 0.2 0.0 - 1.3 K/uL    Eosinophils # 0.0 0.0 - 0.6 K/uL    Basophils # 0.0 0.0 - 0.2 K/uL    Bands Relative 5 0 - 7 %    Metamyelocytes Relative 1 (A) %    Anisocytosis Occasional (A)     Carrington Cells Occasional (A)     Tear Drop Cells Occasional (A)    Troponin   Result Value Ref Range    Troponin 0.10 (H) <0.01 ng/mL   Basic Metabolic Panel w/ Reflex to MG   Result Value Ref Range    Sodium 136 136 - 145 mmol/L    Potassium reflex Magnesium 4.2 3.5 - 5.1 mmol/L    Chloride 96 (L) 99 - 110 mmol/L    CO2 22 21 - 32 mmol/L    Anion Gap 18 (H) 3 - 16    Glucose 108 (H) 70 - 99 mg/dL    BUN 60 (H) 7 - 20 mg/dL    CREATININE 3.5 (H) 0.8 - 1.3 mg/dL    GFR Non-African American 17 (A) >60    GFR  21 (A) >60    Calcium 8.4 8.3 - 10.6 mg/dL   Lipase   Result Value Ref Range    Lipase 6.0 (L) 13.0 - 60.0 U/L   Urinalysis, reflex to microscopic   Result Value Ref Range    Urine Type Voided    Brain Natriuretic Peptide   Result Value Ref Range    Pro-BNP 10,190 (H) 0 - 124 pg/mL   Hepatic Function Panel   Result Value Ref Range    Total Protein 4.6 (L) 6.4 - 8.2 g/dL    Alb 2.8 (L) 3.4 - 5.0 g/dL    Alkaline Phosphatase 86 40 - 129 U/L    ALT 14 10 - 40 U/L    AST 42 (H) 15 - 37 U/L    Total Bilirubin 0.4 0.0 - 1.0 mg/dL    Bilirubin, Direct <0.2 0.0 - 0.3 mg/dL    Bilirubin, Indirect see below 0.0 - 1.0 mg/dL   POCT Venous   Result Value Ref Range    POC Sodium 135 (L) 136 - 145 mmol/L    POC Potassium 4.1 3.5 - 5.1 mmol/L    POC Chloride 98 (L) 99 - 110 mmol/L    CO2 25 21 - 32 mmol/L    POC Anion Gap 12 10 - 20    POC Glucose 102 (H) 70 - 99 mg/dl    POC BUN 58 abnormalities and no acute osseous abnormalities of the cervical spine. At this time, a call was placed to the patient's oncologist at [facility], Dr. Luci Bowling, and the patient will be admitted to the hospitalist service for further evaluation and treatment. The patient is amenable with this plan, he has thus far remained stable, though hypotensive, throughout his stay. This patient was also evaluated by the attending physician. All care plans were discussed and agreed upon. Clinical Impression     1. Pneumonia due to organism    2. Hypervolemia, unspecified hypervolemia type        Disposition     PATIENT REFERRED TO:  No follow-up provider specified.     DISCHARGE MEDICATIONS:  New Prescriptions    No medications on file       DISPOSITION Decision To Admit 10/16/2018 04:58:52 PM       Neli Singer PA-C  10/16/18 0945

## 2018-10-16 NOTE — H&P
Internal Medicine PGY-1 Resident History & Physical      PCP: John Mandel MD    Date of Admission: 10/16/2018    Date of Service: Pt seen/examined on 10/16/2018 and Admitted to Inpatient with expected LOS greater than two midnights due to medical therapy. Chief Complaint:   Fall, weakness. History Of Present Illness:  68 y.o. male w/ H/o large cell lymphoma, mesothelioma (last chemo 3 wks ago), CKD III, DM presenting with mechanical fall. He was at [facility] coming out of fluid treatment (1L), felt good and when waking out he fell over the curb. At the time he was using his walker. His toe hit the curb and his body fell over the top of his walker. He was not seriously hurt, no LOC, he remembers the fall. He tried to catch himself when fell and bruised his knee. He has had one other recent fall which occured earlier this morning. He was feeling weak getting off the toilet - has a  bar in the shower but got weak and could not reach it leading to the fall this morning. He remembers falling, no lightheadedness or dizziness, no SOB. No chest pain, palpitations, or tightness. No diaphoresis, no nausea, no arm or jaw pain. Before today, his last fall was a while back. When falling, he reports no auras, dizziness, lightheadedness, LOC. He only felt weak and fell. He receives fluids almost monthly at [facility] for feeling weak. Feels better after he gets fluid. No fever, chills, night sweats. No nausea, abdominal pain, diarrhea, changes in BM. No black stool, red stool, difficulty with urination. No itching/ burning on urination. No increase in frequency on urination. No new cough. No history of smoking. No alcohol use. Past Medical History:          Diagnosis Date    Abnormal echocardiogram 1/19/2016    Mild aortic and mitral regurgitation is present.   Mild tricuspid regurgitation     Allergic     Allergic rhinitis     Cancer [facility])     lymphoma 2017:under care of specialists:Large cell presentation: Fatigue and Fall          A/p  1. Pneumonia BL , likely gram positive  2. MINDA , baseline cr around 1.5   3. Age related physical debility  4. Edema BL LE  5. Mesothelioma, lymphoma    Plan  IV abx for pneumonia . Preferable levofloxacin to cover atypical as well. Keep fluid balance neutral. Difficult to decide fluid balance. Nephrology consult. Medical oncology consulted for cancer hx.    PT BERNABE Boswell  8:28 PM

## 2018-10-17 LAB
ANION GAP SERPL CALCULATED.3IONS-SCNC: 14 MMOL/L (ref 3–16)
ANISOCYTOSIS: ABNORMAL
BASOPHILS ABSOLUTE: 0 K/UL (ref 0–0.2)
BASOPHILS RELATIVE PERCENT: 0 %
BUN BLDV-MCNC: 64 MG/DL (ref 7–20)
CALCIUM SERPL-MCNC: 8.2 MG/DL (ref 8.3–10.6)
CHLORIDE BLD-SCNC: 97 MMOL/L (ref 99–110)
CO2: 24 MMOL/L (ref 21–32)
CREAT SERPL-MCNC: 3.7 MG/DL (ref 0.8–1.3)
EOSINOPHILS ABSOLUTE: 0 K/UL (ref 0–0.6)
EOSINOPHILS RELATIVE PERCENT: 0 %
GFR AFRICAN AMERICAN: 20
GFR NON-AFRICAN AMERICAN: 16
GLUCOSE BLD-MCNC: 118 MG/DL (ref 70–99)
HCT VFR BLD CALC: 23.5 % (ref 40.5–52.5)
HEMOGLOBIN: 7.6 G/DL (ref 13.5–17.5)
LV EF: 58 %
LVEF MODALITY: NORMAL
LYMPHOCYTES ABSOLUTE: 0.3 K/UL (ref 1–5.1)
LYMPHOCYTES RELATIVE PERCENT: 2 %
MCH RBC QN AUTO: 31.1 PG (ref 26–34)
MCHC RBC AUTO-ENTMCNC: 32.5 G/DL (ref 31–36)
MCV RBC AUTO: 95.6 FL (ref 80–100)
MONOCYTES ABSOLUTE: 0.3 K/UL (ref 0–1.3)
MONOCYTES RELATIVE PERCENT: 2 %
NEUTROPHILS ABSOLUTE: 13.6 K/UL (ref 1.7–7.7)
NEUTROPHILS RELATIVE PERCENT: 96 %
PDW BLD-RTO: 17 % (ref 12.4–15.4)
PLATELET # BLD: 122 K/UL (ref 135–450)
PMV BLD AUTO: 8.9 FL (ref 5–10.5)
POTASSIUM REFLEX MAGNESIUM: 3.8 MMOL/L (ref 3.5–5.1)
PROCALCITONIN: 0.43 NG/ML (ref 0–0.15)
PROTEIN PROTEIN: 19.3 MG/DL
RBC # BLD: 2.46 M/UL (ref 4.2–5.9)
RESPIRATORY PANEL PCR: NORMAL
SODIUM BLD-SCNC: 135 MMOL/L (ref 136–145)
SODIUM URINE: <20 MMOL/L
TROPONIN: 0.08 NG/ML
TROPONIN: 0.09 NG/ML
WBC # BLD: 14.2 K/UL (ref 4–11)

## 2018-10-17 PROCEDURE — 97165 OT EVAL LOW COMPLEX 30 MIN: CPT

## 2018-10-17 PROCEDURE — 97530 THERAPEUTIC ACTIVITIES: CPT

## 2018-10-17 PROCEDURE — 2060000000 HC ICU INTERMEDIATE R&B

## 2018-10-17 PROCEDURE — 6360000002 HC RX W HCPCS: Performed by: STUDENT IN AN ORGANIZED HEALTH CARE EDUCATION/TRAINING PROGRAM

## 2018-10-17 PROCEDURE — G8987 SELF CARE CURRENT STATUS: HCPCS

## 2018-10-17 PROCEDURE — G8988 SELF CARE GOAL STATUS: HCPCS

## 2018-10-17 PROCEDURE — 6370000000 HC RX 637 (ALT 250 FOR IP): Performed by: STUDENT IN AN ORGANIZED HEALTH CARE EDUCATION/TRAINING PROGRAM

## 2018-10-17 PROCEDURE — 82570 ASSAY OF URINE CREATININE: CPT

## 2018-10-17 PROCEDURE — 84484 ASSAY OF TROPONIN QUANT: CPT

## 2018-10-17 PROCEDURE — 93306 TTE W/DOPPLER COMPLETE: CPT

## 2018-10-17 PROCEDURE — 84300 ASSAY OF URINE SODIUM: CPT

## 2018-10-17 PROCEDURE — 85025 COMPLETE CBC W/AUTO DIFF WBC: CPT

## 2018-10-17 PROCEDURE — P9047 ALBUMIN (HUMAN), 25%, 50ML: HCPCS | Performed by: INTERNAL MEDICINE

## 2018-10-17 PROCEDURE — 84156 ASSAY OF PROTEIN URINE: CPT

## 2018-10-17 PROCEDURE — 2580000003 HC RX 258: Performed by: STUDENT IN AN ORGANIZED HEALTH CARE EDUCATION/TRAINING PROGRAM

## 2018-10-17 PROCEDURE — 6360000002 HC RX W HCPCS: Performed by: INTERNAL MEDICINE

## 2018-10-17 PROCEDURE — 97535 SELF CARE MNGMENT TRAINING: CPT

## 2018-10-17 PROCEDURE — 36591 DRAW BLOOD OFF VENOUS DEVICE: CPT

## 2018-10-17 PROCEDURE — 80048 BASIC METABOLIC PNL TOTAL CA: CPT

## 2018-10-17 PROCEDURE — 87449 NOS EACH ORGANISM AG IA: CPT

## 2018-10-17 RX ORDER — ALBUMIN (HUMAN) 12.5 G/50ML
25 SOLUTION INTRAVENOUS EVERY 6 HOURS
Status: COMPLETED | OUTPATIENT
Start: 2018-10-17 | End: 2018-10-18

## 2018-10-17 RX ADMIN — MULTIPLE VITAMINS W/ MINERALS TAB 1 TABLET: TAB at 10:24

## 2018-10-17 RX ADMIN — MORPHINE SULFATE 30 MG: 30 TABLET, FILM COATED, EXTENDED RELEASE ORAL at 21:10

## 2018-10-17 RX ADMIN — LEVOFLOXACIN 750 MG: 5 INJECTION, SOLUTION INTRAVENOUS at 00:43

## 2018-10-17 RX ADMIN — FERROUS SULFATE TAB 325 MG (65 MG ELEMENTAL FE) 325 MG: 325 (65 FE) TAB at 18:37

## 2018-10-17 RX ADMIN — CALCIUM 500 MG: 500 TABLET ORAL at 10:24

## 2018-10-17 RX ADMIN — AZITHROMYCIN 250 MG: 250 TABLET, FILM COATED ORAL at 10:24

## 2018-10-17 RX ADMIN — SENNOSIDES AND DOCUSATE SODIUM 1 TABLET: 8.6; 5 TABLET ORAL at 10:24

## 2018-10-17 RX ADMIN — HEPARIN SODIUM 5000 UNITS: 5000 INJECTION INTRAVENOUS; SUBCUTANEOUS at 21:11

## 2018-10-17 RX ADMIN — FERROUS SULFATE TAB 325 MG (65 MG ELEMENTAL FE) 325 MG: 325 (65 FE) TAB at 10:24

## 2018-10-17 RX ADMIN — ALBUMIN (HUMAN) 25 G: 0.25 INJECTION, SOLUTION INTRAVENOUS at 18:55

## 2018-10-17 RX ADMIN — FERROUS SULFATE TAB 325 MG (65 MG ELEMENTAL FE) 325 MG: 325 (65 FE) TAB at 12:54

## 2018-10-17 RX ADMIN — Medication 10 ML: at 10:24

## 2018-10-17 RX ADMIN — HEPARIN SODIUM 5000 UNITS: 5000 INJECTION INTRAVENOUS; SUBCUTANEOUS at 12:54

## 2018-10-17 RX ADMIN — HEPARIN SODIUM 5000 UNITS: 5000 INJECTION INTRAVENOUS; SUBCUTANEOUS at 05:30

## 2018-10-17 RX ADMIN — MORPHINE SULFATE 30 MG: 30 TABLET, FILM COATED, EXTENDED RELEASE ORAL at 10:24

## 2018-10-17 RX ADMIN — SENNOSIDES AND DOCUSATE SODIUM 1 TABLET: 8.6; 5 TABLET ORAL at 21:10

## 2018-10-17 RX ADMIN — SIMVASTATIN 40 MG: 40 TABLET, FILM COATED ORAL at 21:10

## 2018-10-17 ASSESSMENT — PAIN SCALES - GENERAL
PAINLEVEL_OUTOF10: 0
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
## 2018-10-17 NOTE — CONSULTS
History Main Topics    Smoking status: Never Smoker    Smokeless tobacco: Never Used    Alcohol use No    Drug use: No    Sexual activity: Not on file     Other Topics Concern    Not on file     Social History Narrative    No narrative on file       Family History:     Family History   Problem Relation Age of Onset    Diabetes Mother          age 80    Cancer Mother         ovarian canver    Emphysema Father         worked at [employer]. 90s    Cancer Father         bladder cancer    Diabetes Father     Heart Attack Brother         age 59    Alzheimer's Disease Maternal Grandfather     Seizures Son        REVIEW OF SYSTEMS:    Review of Systems   Constitutional: Positive for fatigue. HENT: Negative. Eyes: Negative. Respiratory: Positive for shortness of breath. Cardiovascular: Negative. Gastrointestinal:        Anorexia   Endocrine: Negative. Genitourinary: Negative. Musculoskeletal: Negative. Skin: Negative. Allergic/Immunologic: Negative. Neurological: Positive for weakness and light-headedness. Hematological: Negative. PHYSICAL EXAM:      Vitals:  BP (!) 86/56   Pulse 79   Temp 97.5 °F (36.4 °C) (Oral)   Resp 20   Ht 5' 6\" (1.676 m)   Wt 153 lb 10.6 oz (69.7 kg)   SpO2 98%   BMI 24.80 kg/m²       Intake/Output Summary (Last 24 hours) at 10/17/18 0920  Last data filed at 10/17/18 0530   Gross per 24 hour   Intake              683 ml   Output              150 ml   Net              533 ml       Physical Exam   Constitutional: He is oriented to person, place, and time. No distress. Ill-appearing   HENT:   Mouth/Throat: No oropharyngeal exudate. Eyes: No scleral icterus. Cardiovascular: Normal rate, regular rhythm and normal heart sounds. Pulmonary/Chest: No respiratory distress. Rhonchi & crackles on left; decr BS on R   Abdominal: Soft. Bowel sounds are normal.   Musculoskeletal: He exhibits edema.    Lymphadenopathy:     He has no cervical CONTRAST INDICATION: Abnormal x-ray short of breath COMPARISON: Chest x-ray same day, PET scan August 2018 selected images TECHNIQUE: CT of the chest was performed without contrast. Axial images, multiplanar reformatted images and axial maximum intensity projection images provided for review. Individualized dose optimization technique was used in order to meet ALARA standards for radiation dose reduction. In addition to vendor specific dose reduction algorithms, the dose reduction techniques vary based on the specific scanner utilized but frequently include automated exposure control, adjustment of the mA and/or kV  according to patient size, and use of iterative reconstruction technique. CONTRAST: None. FINDINGS: LUNGS AND AIRWAYS: Airways are patent. Right-sided volume loss present. Diffuse pleural thickening on the right is present with peripheral calcification seen posteriorly lung base, evident previously. Pleural thickening is slightly nodular in appearance. Parenchymal density and fine loss in the right lung base is present, with consolidation medially in the bases slightly increased. Diffuse patchy groundglass density left lung is new, with bronchograms. There is some relative peripheral cyst bearing and apical sparing. PLEURA: Diffuse pleural thickening on the right with loculated areas in the base with peripheral calcification, evident previously. Small amount loculated fluid anteriorly. HEART AND GREAT VESSELS: Heart is mildly enlarged. Pericardial small effusion, increased. Mild calcification aortic arch. ADENOPATHY/MEDIASTINUM: Right paratracheal confluent nodes approximately 11 x 15 mm, increased. Subcarinal node is 10 x 15 mm, increased. CHEST WALL / LOWER NECK: Mild diffuse body wall edema. UPPER ABDOMEN: Gallbladder is absent. BONES: No significant abnormality. 1. Diffuse pleural thickening and loculation on the right, evident previously. Chronic atelectasis right base.  2. Moderate groundglass airspace disease throughout the left lung with some peripheral sparing, possible pneumonia. 3. Mild consolidation medially in the right lung base, new. 4. Mild mediastinal adenopathy. 5. Small pericardial effusion. 6. Mild cardiomegaly. 7. Prior cholecystectomy. Ct Cervical Spine Wo Contrast    Result Date: 10/16/2018  EXAM: CT OF THE CERVICAL SPINE WITHOUT CONTRAST INDICATION: Fall with pain COMPARISON: None TECHNIQUE: CT of the cervical spinewas performed without contrast. Axial images, multiplanar reformatted images provided for review. Up-to-date CT equipment and radiation dose reduction techniques were employed. CONTRAST: None. FINDINGS: No evidence of acute fracture or traumatic bony abnormality is identified in the cervical spine. There is a mild degree of multilevel spondylotic change. If concern of discogenic disease, MRI correlation would be recommended. Noted is extensive diffuse pleural thickening seen at the right lung apex. This was noted on prior PET/CT. No fracture seen.         Problem List  Patient Active Problem List   Diagnosis    Hypertrophy of prostate without urinary obstruction    Vitamin D deficiency    Nodular prostate    Erectile dysfunction    Abnormal EKG    Essential hypertension, benign    Diabetes mellitus ([address]    Hyperlipidemia with target LDL less than 100    Severe obesity (BMI 35.0-35.9 with comorbidity)(diabetes) (HCC)    CKD (chronic kidney disease) stage 3, GFR 30-59 ml/min (HCC)    Grade I diastolic dysfunction    Abnormal echocardiogram    S/P right hemicolectomy    Villoglandular adenoma    Retroperitoneal mass    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

## 2018-10-17 NOTE — PROGRESS NOTES
4 Eyes Admission Assessment     I agree as the admission nurse that 2 RN's have performed a thorough Head to Toe Skin Assessment on the patient. ALL assessment sites listed below have been assessed on admission. Areas assessed by both nurses: Cathi Faria  [x]   Head, Face, and Ears   [x]   Shoulders, Back, and Chest  [x]   Arms, Elbows, and Hands   [x]   Coccyx, Sacrum, and Ischum  [x]   Legs, Feet, and Heels        Does the Patient have Skin Breakdown?   Yes a wound was noted on the Admission Assessment and an LDA was Initiated documentation include the Aicha-wound, Wound Assessment, Measurements, Dressing Treatment, Drainage, and Color\",         Yoni Prevention initiated:  Yes   Wound Care Orders initiated:  Yes      WOC nurse consulted for Pressure Injury (Stage 3,4, Unstageable, DTI, NWPT, and Complex wounds):  Yes      Nurse 1 eSignature: Electronically signed by Boston Ivan RN on 10/16/18 at 9:58 PM    **SHARE this note so that the co-signing nurse is able to place an eSignature**    Nurse 2 eSignature: Jose Francisco Bah RN

## 2018-10-17 NOTE — CONSULTS
[facility])  Nephrology Consult H&P  10/17/2018 5:54 PM     Patient: Monika Aranda [id number]  [id number]  Date of Admit: 10/16/2018 LOS: 1 days  Referring physician: Gavi Anderson MD  Outpatient Nephrologist: Dr. Thad Hamman        Date of Request: 10/17/18  Date of Response: 10/17/18    Reason for Consult     MINDA/CKD    Chief complaint: Weakness/falls    History of Present Illness   Monika Aranda is a 68 y.o. 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 Chronic Stable Medical Conditions: HTN, DM, R Hemicolectomy    Patient presents from home with recent falls. He reports he has been increasingly weak and having more LE edema that has caused him to have 2 falls the day of admission. He reports no LOC. He reports some SOB, CXR c/w R Lobe PNA. Patient denies any aspiration, though at times he can be a rather limited historian. Patient follows with Dr. Thad Hamman for CKD stage 3 with a baseline Scr previously of 1.2-1.4 in 2017. Since completing R-CHOP therapy in 9/2017 for NHL his Scr ahs been as low as 1.0 (5/2018) suspected loss of muscle mass? Since he's been diagnosed with mesothelioma he's undergone VATS with biopsy confirmation, and completed Pemetrexed + Cisplatin for 6 cycles; reportedly last cycle 9/2018. It appears his Scr has been trending up since starting therapy 4/2018. He follows with [facility]. Last seen by Dr. Thad Hamman 7/12/2018 and Scr was up 1.9. Lisinopril was held. He denies NSAID use, problems urinating, hematuria. He endorses poor PO intake, worsening LE edema, weakness, nausea, vomiting over the past week. Renal stressors:  Hypotension, Cisplatin.     Past medical, family, and social histories were reviewed as Dr. Angel Carmona - , [facility] Lianna Muck  2017    Dr. Walter Jay - large amts retained fluid suggestive of gastroparesis         Family History     Family History   Problem Relation Age of Onset    Diabetes Mother          age 80    Cancer Mother         ovarian canver    Emphysema Father         worked at [employer]. 90s    Cancer Father         bladder cancer    Diabetes Father     Heart Attack Brother         age 59    Alzheimer's Disease Maternal Grandfather     Seizures Son          Social History     Social History   Substance Use Topics    Smoking status: Never Smoker    Smokeless tobacco: Never Used    Alcohol use No        Medications       Inpatient Meds:  Prescriptions Prior to Admission: morphine (MS CONTIN) 30 MG extended release tablet, Take 30 mg by mouth 2 times daily. .  simvastatin (ZOCOR) 40 MG tablet, TAKE 1 TABLET NIGHTLY  calcium carbonate (OSCAL) 500 MG TABS tablet, Take 500 mg by mouth daily  sennosides-docusate sodium (SENOKOT-S) 8.6-50 MG tablet, Take 1 tablet by mouth every 12 hours   ascorbic acid (VITAMIN C) 100 MG tablet, Take 100 mg by mouth daily  ferrous sulfate 325 (65 FE) MG tablet, Take 325 mg by mouth 3 times daily (with meals)  Multiple Vitamins-Minerals (THERAPEUTIC MULTIVITAMIN-MINERALS) tablet, Take 1 tablet by mouth daily  ACCU-CHEK SMARTVIEW strip, TEST ONE TO TWO TIMES DAILY  ACCU-CHEK FASTCLIX LANCETS MISC, TEST ONE TO TWO TIMES DAILY  ondansetron (ZOFRAN) 8 MG tablet, Take 8 mg by mouth every 8 hours as needed for Nausea or Vomiting  prochlorperazine (COMPAZINE) 10 MG tablet, Take 10 mg by mouth every 6 hours as needed  acetaminophen (TYLENOL) 500 MG tablet, Take 500 mg by mouth every 6 hours as needed for Pain  fluticasone (FLONASE) 50 MCG/ACT nasal spray, 2 sprays by Nasal route daily as needed for Rhinitis    Scheduled Meds:   therapeutic multivitamin-minerals  1 tablet Oral Daily    ferrous sulfate  325 mg Oral TID WC therapy in 9/2017 for NHL his Scr has been as low as 1.0 (5/2018) suspected loss of muscle mass? Since he's been diagnosed with mesothelioma he's undergone VATS with biopsy confirmation, and completed Pemetrexed + Cisplatin for 6 cycles; reportedly last cycle 9/2018. It appears his Scr has been trending up since starting therapy 4/2018. He follows with [facility]. #.) HTN; now hypotensive; all BP meds held. ?UTI / PNA    #.) UTI; UCx pending; on Levofloxacin for PNA  #.) R LL PNA; denies history of aspiration; on Levoflox    #.) NHL s/p RCHOP with normal renal function  #.) Mesothelioma with VATS confirming biopsy; completed Pemetrexed + Cisplatin; follows with [facility]. #.) R Hemicolectomy    #.) LE edema; compression hose. Would not diurese for now. Recommendations:   Non oliguric MINDA; ? intrasvascular depletion vs. Cisplatin induced toxicity  Possible ATN due to hypotension from UTI/PNA? Checking Urine studies; initial UA c/w ATN with granular cast and UTI; Cx pending. Albumin 25 gm q 6 x 4  LE Compression Hose  Renal U/S with PVR      Thank you for allowing us to participate in the care of this patient. Please call with any questions.   Signed:  Yaron Moss M.D.   [facility]  Office Number: [phone]  Fax Number: [phone]  10/17/2018, 5:54 PM

## 2018-10-18 ENCOUNTER — APPOINTMENT (OUTPATIENT)
Dept: ULTRASOUND IMAGING | Age: 73
DRG: 843 | End: 2018-10-18
Payer: MEDICARE

## 2018-10-18 LAB
ALBUMIN SERPL-MCNC: 3.1 G/DL (ref 3.4–5)
ANION GAP SERPL CALCULATED.3IONS-SCNC: 15 MMOL/L (ref 3–16)
ANISOCYTOSIS: ABNORMAL
BANDED NEUTROPHILS RELATIVE PERCENT: 2 % (ref 0–7)
BASOPHILS ABSOLUTE: 0.1 K/UL (ref 0–0.2)
BASOPHILS RELATIVE PERCENT: 1 %
BUN BLDV-MCNC: 65 MG/DL (ref 7–20)
CALCIUM SERPL-MCNC: 8.4 MG/DL (ref 8.3–10.6)
CHLORIDE BLD-SCNC: 99 MMOL/L (ref 99–110)
CO2: 25 MMOL/L (ref 21–32)
CREAT SERPL-MCNC: 3.8 MG/DL (ref 0.8–1.3)
CREATININE URINE: 90.4 MG/DL (ref 39–259)
EOSINOPHILS ABSOLUTE: 0 K/UL (ref 0–0.6)
EOSINOPHILS RELATIVE PERCENT: 0 %
GFR AFRICAN AMERICAN: 19
GFR NON-AFRICAN AMERICAN: 16
GLUCOSE BLD-MCNC: 106 MG/DL (ref 70–99)
HCT VFR BLD CALC: 21.8 % (ref 40.5–52.5)
HEMOGLOBIN: 7.1 G/DL (ref 13.5–17.5)
INR BLD: 1.21 (ref 0.86–1.14)
L. PNEUMOPHILA SEROGP 1 UR AG: NORMAL
LYMPHOCYTES ABSOLUTE: 0.1 K/UL (ref 1–5.1)
LYMPHOCYTES RELATIVE PERCENT: 1 %
MCH RBC QN AUTO: 31.7 PG (ref 26–34)
MCHC RBC AUTO-ENTMCNC: 32.8 G/DL (ref 31–36)
MCV RBC AUTO: 96.7 FL (ref 80–100)
MONOCYTES ABSOLUTE: 0.3 K/UL (ref 0–1.3)
MONOCYTES RELATIVE PERCENT: 2 %
NEUTROPHILS ABSOLUTE: 12.5 K/UL (ref 1.7–7.7)
NEUTROPHILS RELATIVE PERCENT: 94 %
ORGANISM: ABNORMAL
PARATHYROID HORMONE INTACT: 38.2 PG/ML (ref 14–72)
PDW BLD-RTO: 17.1 % (ref 12.4–15.4)
PHOSPHORUS: 4.4 MG/DL (ref 2.5–4.9)
PLATELET # BLD: 111 K/UL (ref 135–450)
PMV BLD AUTO: 9.2 FL (ref 5–10.5)
POTASSIUM REFLEX MAGNESIUM: 3.8 MMOL/L (ref 3.5–5.1)
POTASSIUM SERPL-SCNC: 3.8 MMOL/L (ref 3.5–5.1)
PROTHROMBIN TIME: 13.8 SEC (ref 9.8–13)
RBC # BLD: 2.25 M/UL (ref 4.2–5.9)
SODIUM BLD-SCNC: 139 MMOL/L (ref 136–145)
STREP PNEUMONIAE ANTIGEN, URINE: NORMAL
TOTAL CK: 75 U/L (ref 39–308)
TOXIC GRANULATION: PRESENT
URIC ACID, SERUM: 10.7 MG/DL (ref 3.5–7.2)
URINE CULTURE, ROUTINE: ABNORMAL
URINE CULTURE, ROUTINE: ABNORMAL
VITAMIN D 25-HYDROXY: 37.7 NG/ML
WBC # BLD: 13 K/UL (ref 4–11)

## 2018-10-18 PROCEDURE — 2580000003 HC RX 258: Performed by: INTERNAL MEDICINE

## 2018-10-18 PROCEDURE — 82550 ASSAY OF CK (CPK): CPT

## 2018-10-18 PROCEDURE — G8978 MOBILITY CURRENT STATUS: HCPCS

## 2018-10-18 PROCEDURE — 51798 US URINE CAPACITY MEASURE: CPT

## 2018-10-18 PROCEDURE — 51702 INSERT TEMP BLADDER CATH: CPT

## 2018-10-18 PROCEDURE — 76770 US EXAM ABDO BACK WALL COMP: CPT

## 2018-10-18 PROCEDURE — 97116 GAIT TRAINING THERAPY: CPT

## 2018-10-18 PROCEDURE — 6360000002 HC RX W HCPCS: Performed by: STUDENT IN AN ORGANIZED HEALTH CARE EDUCATION/TRAINING PROGRAM

## 2018-10-18 PROCEDURE — 85610 PROTHROMBIN TIME: CPT

## 2018-10-18 PROCEDURE — 82306 VITAMIN D 25 HYDROXY: CPT

## 2018-10-18 PROCEDURE — 2580000003 HC RX 258: Performed by: STUDENT IN AN ORGANIZED HEALTH CARE EDUCATION/TRAINING PROGRAM

## 2018-10-18 PROCEDURE — 85025 COMPLETE CBC W/AUTO DIFF WBC: CPT

## 2018-10-18 PROCEDURE — P9047 ALBUMIN (HUMAN), 25%, 50ML: HCPCS | Performed by: INTERNAL MEDICINE

## 2018-10-18 PROCEDURE — 83970 ASSAY OF PARATHORMONE: CPT

## 2018-10-18 PROCEDURE — 97162 PT EVAL MOD COMPLEX 30 MIN: CPT

## 2018-10-18 PROCEDURE — 6370000000 HC RX 637 (ALT 250 FOR IP): Performed by: STUDENT IN AN ORGANIZED HEALTH CARE EDUCATION/TRAINING PROGRAM

## 2018-10-18 PROCEDURE — G8979 MOBILITY GOAL STATUS: HCPCS

## 2018-10-18 PROCEDURE — 2060000000 HC ICU INTERMEDIATE R&B

## 2018-10-18 PROCEDURE — 84550 ASSAY OF BLOOD/URIC ACID: CPT

## 2018-10-18 PROCEDURE — 6360000002 HC RX W HCPCS: Performed by: INTERNAL MEDICINE

## 2018-10-18 PROCEDURE — 36592 COLLECT BLOOD FROM PICC: CPT

## 2018-10-18 PROCEDURE — 97530 THERAPEUTIC ACTIVITIES: CPT

## 2018-10-18 PROCEDURE — 99223 1ST HOSP IP/OBS HIGH 75: CPT | Performed by: INTERNAL MEDICINE

## 2018-10-18 PROCEDURE — 80069 RENAL FUNCTION PANEL: CPT

## 2018-10-18 RX ORDER — 0.9 % SODIUM CHLORIDE 0.9 %
1000 INTRAVENOUS SOLUTION INTRAVENOUS ONCE
Status: COMPLETED | OUTPATIENT
Start: 2018-10-18 | End: 2018-10-18

## 2018-10-18 RX ADMIN — ALBUMIN (HUMAN) 25 G: 0.25 INJECTION, SOLUTION INTRAVENOUS at 00:33

## 2018-10-18 RX ADMIN — SENNOSIDES AND DOCUSATE SODIUM 1 TABLET: 8.6; 5 TABLET ORAL at 09:06

## 2018-10-18 RX ADMIN — HEPARIN SODIUM 5000 UNITS: 5000 INJECTION INTRAVENOUS; SUBCUTANEOUS at 13:21

## 2018-10-18 RX ADMIN — SODIUM CHLORIDE 1000 ML: 9 INJECTION, SOLUTION INTRAVENOUS at 15:36

## 2018-10-18 RX ADMIN — MORPHINE SULFATE 30 MG: 30 TABLET, FILM COATED, EXTENDED RELEASE ORAL at 09:06

## 2018-10-18 RX ADMIN — MORPHINE SULFATE 30 MG: 30 TABLET, FILM COATED, EXTENDED RELEASE ORAL at 20:39

## 2018-10-18 RX ADMIN — ALBUMIN (HUMAN) 25 G: 0.25 INJECTION, SOLUTION INTRAVENOUS at 06:10

## 2018-10-18 RX ADMIN — CALCIUM 500 MG: 500 TABLET ORAL at 09:06

## 2018-10-18 RX ADMIN — SODIUM CHLORIDE 1000 ML: 9 INJECTION, SOLUTION INTRAVENOUS at 09:04

## 2018-10-18 RX ADMIN — SENNOSIDES AND DOCUSATE SODIUM 1 TABLET: 8.6; 5 TABLET ORAL at 20:39

## 2018-10-18 RX ADMIN — HEPARIN SODIUM 5000 UNITS: 5000 INJECTION INTRAVENOUS; SUBCUTANEOUS at 06:13

## 2018-10-18 RX ADMIN — FERROUS SULFATE TAB 325 MG (65 MG ELEMENTAL FE) 325 MG: 325 (65 FE) TAB at 17:42

## 2018-10-18 RX ADMIN — HEPARIN SODIUM 5000 UNITS: 5000 INJECTION INTRAVENOUS; SUBCUTANEOUS at 20:39

## 2018-10-18 RX ADMIN — FERROUS SULFATE TAB 325 MG (65 MG ELEMENTAL FE) 325 MG: 325 (65 FE) TAB at 13:13

## 2018-10-18 RX ADMIN — ALBUMIN (HUMAN) 25 G: 0.25 INJECTION, SOLUTION INTRAVENOUS at 13:13

## 2018-10-18 RX ADMIN — FERROUS SULFATE TAB 325 MG (65 MG ELEMENTAL FE) 325 MG: 325 (65 FE) TAB at 09:06

## 2018-10-18 RX ADMIN — SIMVASTATIN 40 MG: 40 TABLET, FILM COATED ORAL at 20:39

## 2018-10-18 RX ADMIN — MULTIPLE VITAMINS W/ MINERALS TAB 1 TABLET: TAB at 09:06

## 2018-10-18 RX ADMIN — Medication 10 ML: at 09:05

## 2018-10-18 ASSESSMENT — PAIN SCALES - GENERAL
PAINLEVEL_OUTOF10: 0

## 2018-10-18 ASSESSMENT — ENCOUNTER SYMPTOMS
ALLERGIC/IMMUNOLOGIC NEGATIVE: 1
SHORTNESS OF BREATH: 1
GASTROINTESTINAL NEGATIVE: 1
EYES NEGATIVE: 1

## 2018-10-18 NOTE — PROGRESS NOTES
2000  Report received. 0041  Pt ambulated to bathroom with stand by assist.  Reviewed NPO status after midnight due to bronch in am.  Informed consent signature obtained. 0300  Pt resting with eyes closed. 6739  Blood drawn per right chest wall port and sent to lab.  0600  Pt awake in bed watching TV. Reviewed NPO status.   Pt to have bronch at 0830 this am.

## 2018-10-18 NOTE — PROGRESS NOTES
not diaphoretic. Labs:   Recent Labs      10/16/18   1426  10/17/18   0250   WBC  16.4*  14.2*   HGB  8.1*  7.6*   HCT  25.0*  23.5*   PLT  126*  122*     Recent Labs      10/16/18   1426  10/17/18   0250  10/18/18   0400   NA  136  135*  139   K  4.2  3.8  3.8  3.8   CL  96*  97*  99   CO2  22  24  25   BUN  60*  64*  65*   CREATININE  3.5*  3.7*  3.8*   CALCIUM  8.4  8.2*  8.4   PHOS   --    --   4.4     Recent Labs      10/16/18   1426   AST  42*   ALT  14   BILIDIR  <0.2   BILITOT  0.4   ALKPHOS  86     No results for input(s): INR in the last 72 hours. Recent Labs      10/16/18   1426  10/17/18   0250  10/17/18   1258  10/18/18   0400   CKTOTAL   --    --    --   75   TROPONINI  0.10*  0.09*  0.08*   --        Urinalysis:      Lab Results   Component Value Date    NITRU Negative 10/16/2018    WBCUA 6-10 10/16/2018    BACTERIA 4+ 10/16/2018    RBCUA None seen 10/16/2018    BLOODU TRACE-INTACT 10/16/2018    SPECGRAV 1.020 10/16/2018    GLUCOSEU Negative 10/16/2018       Radiology:  CT CHEST WO CONTRAST   Final Result      1. Diffuse pleural thickening and loculation on the right, evident previously. Chronic atelectasis right base. 2. Moderate groundglass airspace disease throughout the left lung with some peripheral sparing, possible pneumonia. 3. Mild consolidation medially in the right lung base, new. 4. Mild mediastinal adenopathy. 5. Small pericardial effusion. 6. Mild cardiomegaly. 7. Prior cholecystectomy. CT Head WO Contrast   Final Result      No evidence of acute intracranial abnormality. CT Cervical Spine WO Contrast   Final Result      No fracture seen. XR CHEST STANDARD (2 VW)   Final Result      No significant change right lower lung pleural parenchymal opacity      New left mid and lower lung opacity. Findings may relate to edema or pneumonia. US RENAL COMPLETE    (Results Pending)           Assessment  1.  Pneumonia BL, likely gram positive  2. UTI  3. MINDA  4. Age related debility   5. Lymphoma, mesothelioma  6. Edema         Plan  · Continue levofloxacin for pneumonia. Discussed with oncology it could be pneumonia versus edema versus lymphangitic carcinomatosis. Urine cultures are growing klebsiella. Continue Levofloxacin. Check PVR bladder scan. MINDA unclear cause. Will check Bladder scan. On IV albumin. Urine Na<20 with normal EF likely IV volume depletion. Continue IV albumin. May need fluids as well. · PT OT   · Discussed with oncology.         DVT Prophylaxis: Heparin   Diet: DIET GENERAL;  Dietary Nutrition Supplements: Standard High Calorie Oral Supplement  Code Status: Full Code    PT/OT Eval Status: Marina Jerome MD

## 2018-10-18 NOTE — PROGRESS NOTES
Physical Therapy  No Treatment  Referral received and chart reviewed. Pt just left floor for ultrasound. Will try back later today as time and schedule allow vs. 10/19.     Ti Hudson

## 2018-10-18 NOTE — PROGRESS NOTES
negative for  HMB-45 and MART-1 (excluding metastatic malignant melanoma). The negative  TTF-1 argues against a pulmonary parenchyma adenocarcinoma. The strong  calretinin positivity, in addition to the CK-5/6 and patchy D2-40  positivity supports the impression of malignant mesothelioma.     As part of this department's review process, this case has been  prospectively reviewed by multiple members of the department [facility], [facility] Pathologists) who are in agreement with the above  interpretation.    VAGJO/VAGJO    Problem List  Patient Active Problem List   Diagnosis    Hypertrophy of prostate without urinary obstruction    Vitamin D deficiency    Nodular prostate    Erectile dysfunction    Abnormal EKG    Essential hypertension, benign    Diabetes mellitus ([address]    Hyperlipidemia with target LDL less than 100    Severe obesity (BMI 35.0-35.9 with comorbidity)(diabetes) ([address]    CKD (chronic kidney disease) stage 3, GFR 30-59 ml/min (HCC)    Grade I diastolic dysfunction    Abnormal echocardiogram    S/P right hemicolectomy    Villoglandular adenoma    Retroperitoneal mass    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)    Orthostatic hypotension    Controlled type 2 diabetes mellitus with stage 3 chronic kidney disease, without long-term current use of insulin (HCC)    Anemia, iron deficiency, inadequate dietary intake    Cachexia ([address]    Chemotherapy induced nausea and vomiting    Hypotension due to drugs    Colon polyp    Drug induced insomnia ([address]    Neoplastic malignant related fatigue    Pancytopenia ([address]    Duodenal stricture    Acute pain of right shoulder    Pleural effusion    Hydropneumothorax    Mesothelioma:right    Severe malnutrition ([address]    Accident due to mechanical fall without injury       Assessment and Plan:     Malignant Mesothelioma - Progressive disease demonstrated in Aug 2018 while receiving standard chemotherapy with

## 2018-10-18 NOTE — CONSULTS
VITALS:  BP (!) 91/57   Pulse 78   Temp 97.8 °F (36.6 °C) (Oral)   Resp 18   Ht 5' 6\" (1.676 m)   Wt 153 lb 10.6 oz (69.7 kg)   SpO2 95%   BMI 24.80 kg/m²    24HR INTAKE/OUTPUT:    Intake/Output Summary (Last 24 hours) at 10/18/18 1045  Last data filed at 10/18/18 0904   Gross per 24 hour   Intake              700 ml   Output              275 ml   Net              425 ml     CURRENT PULSE OXIMETRY:  SpO2: 95 %  24HR PULSE OXIMETRY RANGE:  SpO2  Av.7 %  Min: 91 %  Max: 97 %    CONSTITUTIONAL:  Cachetic appearing awake, alert, cooperative, no apparent distress, and appears stated age  NECK:  Supple, symmetrical, trachea midline  LUNGS: Decreased breath sounds in R lung field with mild crackles heard in L lung field, no rales or rhonchi noted   CARDIOVASCULAR:  normal S1 and S2, no edema and no JVD  ABDOMEN:  normal bowel sounds, non-distended and no masses palpated, and no tenderness to palpation. No hepatospleenomegaly  PSYCHIATRIC: Oriented to person place and time. No obvious depression or anxiety. MUSCULOSKELETAL: No obvious misalignment or effusion of the joints. No clubbing, cyanosis of the digits. No edema noted  SKIN:  normal skin color, texture, turgor and no redness, warmth, or swelling.  No palpable nodules    DATA:    Old records have been reviewed  CBC with Differential:  Lab Results   Component Value Date    WBC 14.2 10/17/2018    RBC 2.46 10/17/2018    RBC 3.10 2017    HGB 7.6 10/17/2018    HCT 23.5 10/17/2018     10/17/2018    MCV 95.6 10/17/2018    MCH 31.1 10/17/2018    MCHC 32.5 10/17/2018    RDW 17.0 10/17/2018    NRBC 1 10/11/2017    SEGSPCT 66.1 2010    BANDSPCT 5 10/16/2018    METASPCT 1 10/16/2018    LYMPHOPCT 2.0 10/17/2018    LYMPHOPCT 20.1 2017    MONOPCT 2.0 10/17/2018    EOSPCT 3.3 2010    BASOPCT 0.0 10/17/2018    MONOSABS 0.3 10/17/2018    LYMPHSABS 0.3 10/17/2018    EOSABS 0.0 10/17/2018    BASOSABS 0.0 10/17/2018    DIFFTYPE Auto-K lymphadenopathy. He denies any fevers, chills, night sweats, anorexia, weight loss, dyspnea, chest pain, or cough. He is on 2 L of oxygen at present With an oxygen saturation of 95%. Patient was not previously on oxygen    Etiology is unclear but could be an atypical pneumonia vs pneumonitis from pembrolizumab or even lymphangitic spread of his Mesothelioma. Urine Legionella and strep pneumonia antigen are both negative as is Respiratory pathogen PCR panel. At this time I think it is best to proceed with bronchoscopy with BAL and transbronchial biopsies of his left lung. I will check an INR today. Platelets are adequate in number. He is not on any antiplatelet or therapeutic anticoagulants. Patient is aware of complications, including pulmonary hemorrhage and pneumothorax, and is willing to proceed. He'll be nothing by mouth after midnight.   Procedure is scheduled for tomorrow at 8:30 AM    Corey Schreiber MD

## 2018-10-19 LAB
ANION GAP SERPL CALCULATED.3IONS-SCNC: 17 MMOL/L (ref 3–16)
ANISOCYTOSIS: ABNORMAL
APPEARANCE BAL (LAVAGE): ABNORMAL
BASOPHILS ABSOLUTE: 0 K/UL (ref 0–0.2)
BASOPHILS RELATIVE PERCENT: 0 %
BUN BLDV-MCNC: 57 MG/DL (ref 7–20)
CALCIUM SERPL-MCNC: 8.5 MG/DL (ref 8.3–10.6)
CHLORIDE BLD-SCNC: 99 MMOL/L (ref 99–110)
CLOT EVALUATION BAL: ABNORMAL
CO2: 24 MMOL/L (ref 21–32)
COLOR LAVAGE: ABNORMAL
CREAT SERPL-MCNC: 3.6 MG/DL (ref 0.8–1.3)
EOSIN: 4 %
EOSINOPHILS ABSOLUTE: 0 K/UL (ref 0–0.6)
EOSINOPHILS RELATIVE PERCENT: 0 %
GFR AFRICAN AMERICAN: 20
GFR NON-AFRICAN AMERICAN: 17
GLUCOSE BLD-MCNC: 119 MG/DL (ref 70–99)
HCT VFR BLD CALC: 24.5 % (ref 40.5–52.5)
HEMOGLOBIN: 7.9 G/DL (ref 13.5–17.5)
LYMPHOCYTES ABSOLUTE: 0.2 K/UL (ref 1–5.1)
LYMPHOCYTES RELATIVE PERCENT: 1 %
LYMPHOCYTES, BAL: 13 % (ref 5–10)
MACROCYTES: ABNORMAL
MACROPHAGES, BAL: 19 % (ref 90–95)
MCH RBC QN AUTO: 31.2 PG (ref 26–34)
MCHC RBC AUTO-ENTMCNC: 32 G/DL (ref 31–36)
MCV RBC AUTO: 97.5 FL (ref 80–100)
MONOCYTES ABSOLUTE: 0.2 K/UL (ref 0–1.3)
MONOCYTES RELATIVE PERCENT: 1 %
MONOCYTES, BAL: 2 %
NEUTROPHILS ABSOLUTE: 20.8 K/UL (ref 1.7–7.7)
NEUTROPHILS RELATIVE PERCENT: 98 %
NUMBER OF CELLS COUNTED BAL (LAVAGE): 200
PDW BLD-RTO: 16.9 % (ref 12.4–15.4)
PLATELET # BLD: 126 K/UL (ref 135–450)
PMV BLD AUTO: 8.7 FL (ref 5–10.5)
POTASSIUM REFLEX MAGNESIUM: 4.1 MMOL/L (ref 3.5–5.1)
RBC # BLD: 2.52 M/UL (ref 4.2–5.9)
RBC, BAL: 6544 /CUMM
SEGMENTED NEUTROPHILS, BAL: 62 % (ref 5–10)
SODIUM BLD-SCNC: 140 MMOL/L (ref 136–145)
WBC # BLD: 21.2 K/UL (ref 4–11)
WBC/EPI CELLS BAL: 791 /CUMM

## 2018-10-19 PROCEDURE — 0B9H7ZX DRAINAGE OF LUNG LINGULA, VIA NATURAL OR ARTIFICIAL OPENING, DIAGNOSTIC: ICD-10-PCS | Performed by: INTERNAL MEDICINE

## 2018-10-19 PROCEDURE — 88312 SPECIAL STAINS GROUP 1: CPT

## 2018-10-19 PROCEDURE — 80048 BASIC METABOLIC PNL TOTAL CA: CPT

## 2018-10-19 PROCEDURE — 6370000000 HC RX 637 (ALT 250 FOR IP): Performed by: INTERNAL MEDICINE

## 2018-10-19 PROCEDURE — 87641 MR-STAPH DNA AMP PROBE: CPT

## 2018-10-19 PROCEDURE — 94761 N-INVAS EAR/PLS OXIMETRY MLT: CPT

## 2018-10-19 PROCEDURE — 87102 FUNGUS ISOLATION CULTURE: CPT

## 2018-10-19 PROCEDURE — 6360000002 HC RX W HCPCS: Performed by: INTERNAL MEDICINE

## 2018-10-19 PROCEDURE — 2709999900 HC NON-CHARGEABLE SUPPLY: Performed by: INTERNAL MEDICINE

## 2018-10-19 PROCEDURE — 87798 DETECT AGENT NOS DNA AMP: CPT

## 2018-10-19 PROCEDURE — 2580000003 HC RX 258: Performed by: STUDENT IN AN ORGANIZED HEALTH CARE EDUCATION/TRAINING PROGRAM

## 2018-10-19 PROCEDURE — 87254 VIRUS INOCULATION SHELL VIA: CPT

## 2018-10-19 PROCEDURE — 99152 MOD SED SAME PHYS/QHP 5/>YRS: CPT | Performed by: INTERNAL MEDICINE

## 2018-10-19 PROCEDURE — 88305 TISSUE EXAM BY PATHOLOGIST: CPT

## 2018-10-19 PROCEDURE — 87206 SMEAR FLUORESCENT/ACID STAI: CPT

## 2018-10-19 PROCEDURE — 94664 DEMO&/EVAL PT USE INHALER: CPT

## 2018-10-19 PROCEDURE — 6360000002 HC RX W HCPCS: Performed by: STUDENT IN AN ORGANIZED HEALTH CARE EDUCATION/TRAINING PROGRAM

## 2018-10-19 PROCEDURE — 36591 DRAW BLOOD OFF VENOUS DEVICE: CPT

## 2018-10-19 PROCEDURE — 87205 SMEAR GRAM STAIN: CPT

## 2018-10-19 PROCEDURE — 87651 STREP A DNA AMP PROBE: CPT

## 2018-10-19 PROCEDURE — 6370000000 HC RX 637 (ALT 250 FOR IP): Performed by: STUDENT IN AN ORGANIZED HEALTH CARE EDUCATION/TRAINING PROGRAM

## 2018-10-19 PROCEDURE — 87070 CULTURE OTHR SPECIMN AEROBIC: CPT

## 2018-10-19 PROCEDURE — 87252 VIRUS INOCULATION TISSUE: CPT

## 2018-10-19 PROCEDURE — 3609027000 HC BRONCHOSCOPY: Performed by: INTERNAL MEDICINE

## 2018-10-19 PROCEDURE — 89051 BODY FLUID CELL COUNT: CPT

## 2018-10-19 PROCEDURE — 31624 DX BRONCHOSCOPE/LAVAGE: CPT | Performed by: INTERNAL MEDICINE

## 2018-10-19 PROCEDURE — 87106 FUNGI IDENTIFICATION YEAST: CPT

## 2018-10-19 PROCEDURE — 7100000011 HC PHASE II RECOVERY - ADDTL 15 MIN: Performed by: INTERNAL MEDICINE

## 2018-10-19 PROCEDURE — 85025 COMPLETE CBC W/AUTO DIFF WBC: CPT

## 2018-10-19 PROCEDURE — 87081 CULTURE SCREEN ONLY: CPT

## 2018-10-19 PROCEDURE — 87015 SPECIMEN INFECT AGNT CONCNTJ: CPT

## 2018-10-19 PROCEDURE — 87305 ASPERGILLUS AG IA: CPT

## 2018-10-19 PROCEDURE — 87116 MYCOBACTERIA CULTURE: CPT

## 2018-10-19 PROCEDURE — 87253 VIRUS INOCULATE TISSUE ADDL: CPT

## 2018-10-19 PROCEDURE — 7100000010 HC PHASE II RECOVERY - FIRST 15 MIN: Performed by: INTERNAL MEDICINE

## 2018-10-19 PROCEDURE — 2060000000 HC ICU INTERMEDIATE R&B

## 2018-10-19 PROCEDURE — 87486 CHLMYD PNEUM DNA AMP PROBE: CPT

## 2018-10-19 PROCEDURE — 2700000000 HC OXYGEN THERAPY PER DAY

## 2018-10-19 PROCEDURE — P9047 ALBUMIN (HUMAN), 25%, 50ML: HCPCS | Performed by: STUDENT IN AN ORGANIZED HEALTH CARE EDUCATION/TRAINING PROGRAM

## 2018-10-19 PROCEDURE — 88112 CYTOPATH CELL ENHANCE TECH: CPT

## 2018-10-19 RX ORDER — ALBUMIN (HUMAN) 12.5 G/50ML
25 SOLUTION INTRAVENOUS EVERY 6 HOURS
Status: DISCONTINUED | OUTPATIENT
Start: 2018-10-19 | End: 2018-10-20

## 2018-10-19 RX ORDER — MIDAZOLAM HYDROCHLORIDE 1 MG/ML
INJECTION INTRAMUSCULAR; INTRAVENOUS PRN
Status: DISCONTINUED | OUTPATIENT
Start: 2018-10-19 | End: 2018-10-19 | Stop reason: HOSPADM

## 2018-10-19 RX ORDER — FENTANYL CITRATE 50 UG/ML
INJECTION, SOLUTION INTRAMUSCULAR; INTRAVENOUS PRN
Status: DISCONTINUED | OUTPATIENT
Start: 2018-10-19 | End: 2018-10-19 | Stop reason: HOSPADM

## 2018-10-19 RX ORDER — SODIUM CHLORIDE 9 MG/ML
INJECTION, SOLUTION INTRAVENOUS CONTINUOUS
Status: DISPENSED | OUTPATIENT
Start: 2018-10-19 | End: 2018-10-20

## 2018-10-19 RX ORDER — CASTOR OIL AND BALSAM, PERU 788; 87 MG/G; MG/G
OINTMENT TOPICAL 2 TIMES DAILY
Status: DISCONTINUED | OUTPATIENT
Start: 2018-10-19 | End: 2018-10-24 | Stop reason: HOSPADM

## 2018-10-19 RX ADMIN — MORPHINE SULFATE 30 MG: 30 TABLET, FILM COATED, EXTENDED RELEASE ORAL at 21:28

## 2018-10-19 RX ADMIN — SENNOSIDES AND DOCUSATE SODIUM 1 TABLET: 8.6; 5 TABLET ORAL at 12:41

## 2018-10-19 RX ADMIN — HEPARIN SODIUM 5000 UNITS: 5000 INJECTION INTRAVENOUS; SUBCUTANEOUS at 05:32

## 2018-10-19 RX ADMIN — Medication 10 ML: at 21:18

## 2018-10-19 RX ADMIN — CASTOR OIL AND BALSAM, PERU: 788; 87 OINTMENT TOPICAL at 21:18

## 2018-10-19 RX ADMIN — CALCIUM 500 MG: 500 TABLET ORAL at 12:41

## 2018-10-19 RX ADMIN — ALBUMIN (HUMAN) 25 G: 0.25 INJECTION, SOLUTION INTRAVENOUS at 15:56

## 2018-10-19 RX ADMIN — FERROUS SULFATE TAB 325 MG (65 MG ELEMENTAL FE) 325 MG: 325 (65 FE) TAB at 17:29

## 2018-10-19 RX ADMIN — MULTIPLE VITAMINS W/ MINERALS TAB 1 TABLET: TAB at 12:29

## 2018-10-19 RX ADMIN — LEVOFLOXACIN 500 MG: 5 INJECTION, SOLUTION INTRAVENOUS at 00:28

## 2018-10-19 RX ADMIN — FERROUS SULFATE TAB 325 MG (65 MG ELEMENTAL FE) 325 MG: 325 (65 FE) TAB at 12:40

## 2018-10-19 RX ADMIN — Medication 10 ML: at 12:41

## 2018-10-19 RX ADMIN — SIMVASTATIN 40 MG: 40 TABLET, FILM COATED ORAL at 21:17

## 2018-10-19 RX ADMIN — SODIUM CHLORIDE: 9 INJECTION, SOLUTION INTRAVENOUS at 15:56

## 2018-10-19 RX ADMIN — MORPHINE SULFATE 30 MG: 30 TABLET, FILM COATED, EXTENDED RELEASE ORAL at 12:30

## 2018-10-19 RX ADMIN — CASTOR OIL AND BALSAM, PERU: 788; 87 OINTMENT TOPICAL at 15:23

## 2018-10-19 RX ADMIN — HEPARIN SODIUM 5000 UNITS: 5000 INJECTION INTRAVENOUS; SUBCUTANEOUS at 12:29

## 2018-10-19 RX ADMIN — SENNOSIDES AND DOCUSATE SODIUM 1 TABLET: 8.6; 5 TABLET ORAL at 21:16

## 2018-10-19 RX ADMIN — HEPARIN SODIUM 5000 UNITS: 5000 INJECTION INTRAVENOUS; SUBCUTANEOUS at 21:17

## 2018-10-19 RX ADMIN — ALBUMIN (HUMAN) 25 G: 0.25 INJECTION, SOLUTION INTRAVENOUS at 21:12

## 2018-10-19 ASSESSMENT — PAIN DESCRIPTION - PAIN TYPE
TYPE: CHRONIC PAIN

## 2018-10-19 ASSESSMENT — PAIN DESCRIPTION - PROGRESSION
CLINICAL_PROGRESSION: NOT CHANGED
CLINICAL_PROGRESSION: GRADUALLY IMPROVING
CLINICAL_PROGRESSION: NOT CHANGED
CLINICAL_PROGRESSION: NOT CHANGED

## 2018-10-19 ASSESSMENT — PAIN DESCRIPTION - DESCRIPTORS
DESCRIPTORS: ACHING

## 2018-10-19 ASSESSMENT — PAIN DESCRIPTION - ONSET
ONSET: ON-GOING

## 2018-10-19 ASSESSMENT — PAIN DESCRIPTION - ORIENTATION
ORIENTATION: LOWER

## 2018-10-19 ASSESSMENT — ACTIVITIES OF DAILY LIVING (ADL)
EFFECT OF PAIN ON DAILY ACTIVITIES: REST
EFFECT OF PAIN ON DAILY ACTIVITIES: COMFORT

## 2018-10-19 ASSESSMENT — PAIN DESCRIPTION - FREQUENCY
FREQUENCY: CONTINUOUS

## 2018-10-19 ASSESSMENT — ENCOUNTER SYMPTOMS
SHORTNESS OF BREATH: 1
EYES NEGATIVE: 1
ALLERGIC/IMMUNOLOGIC NEGATIVE: 1
GASTROINTESTINAL NEGATIVE: 1

## 2018-10-19 ASSESSMENT — PAIN DESCRIPTION - LOCATION
LOCATION: BACK

## 2018-10-19 ASSESSMENT — PAIN SCALES - GENERAL
PAINLEVEL_OUTOF10: 7
PAINLEVEL_OUTOF10: 7
PAINLEVEL_OUTOF10: 0
PAINLEVEL_OUTOF10: 5
PAINLEVEL_OUTOF10: 7
PAINLEVEL_OUTOF10: 0

## 2018-10-19 NOTE — PROGRESS NOTES
Clear to auscultation, bilaterally without Rales/Wheezes/Rhonchi. Cardiovascular: Regular rate and rhythm with normal S1/S2 without murmurs, rubs or gallops. Abdomen: Soft, non-tender, non-distended with normal bowel sounds. Musculoskeletal: No clubbing, cyanosis. +2/3 LE pitting edema b/l. Full range of motion without deformity. Skin: Skin color, texture, turgor normal.  No rashes or lesions. Neurologic:  Neurovascularly intact without any focal sensory/motor deficits. Psychiatric: Alert and oriented, thought content appropriate, normal insight      Labs:   Recent Labs      10/17/18   0250  10/18/18   0400  10/19/18   0420   WBC  14.2*  13.0*  21.2*   HGB  7.6*  7.1*  7.9*   HCT  23.5*  21.8*  24.5*   PLT  122*  111*  126*     Recent Labs      10/17/18   0250  10/18/18   0400  10/19/18   0420   NA  135*  139  140   K  3.8  3.8  3.8  4.1   CL  97*  99  99   CO2  24  25  24   BUN  64*  65*  57*   CREATININE  3.7*  3.8*  3.6*   CALCIUM  8.2*  8.4  8.5   PHOS   --   4.4   --      Recent Labs      10/16/18   1426   AST  42*   ALT  14   BILIDIR  <0.2   BILITOT  0.4   ALKPHOS  86     Recent Labs      10/18/18   1253   INR  1.21*     Recent Labs      10/16/18   1426  10/17/18   0250  10/17/18   1258  10/18/18   0400   CKTOTAL   --    --    --   75   TROPONINI  0.10*  0.09*  0.08*   --        Urinalysis:      Lab Results   Component Value Date    NITRU Negative 10/16/2018    WBCUA 6-10 10/16/2018    BACTERIA 4+ 10/16/2018    RBCUA None seen 10/16/2018    BLOODU TRACE-INTACT 10/16/2018    SPECGRAV 1.020 10/16/2018    GLUCOSEU Negative 10/16/2018       Radiology:  US RENAL COMPLETE   Final Result   IMPRESSION :      Trace right perinephric fluid. Mild prostatomegaly. Otherwise unremarkable study. CT CHEST WO CONTRAST   Final Result      1. Diffuse pleural thickening and loculation on the right, evident previously. Chronic atelectasis right base.    2. Moderate groundglass airspace disease throughout the

## 2018-10-19 NOTE — PROCEDURES
PROCEDURE:  BRONCHOSCOPY WITH BRONCHOALVEOLAR LAVAGE     The risks and benefits as well as alternatives to the procedure have been discussed with the patient. The patient understands and agrees to proceed. DESCRIPTION OF PROCEDURE: A time out was taken. Type of sedation used: Moderate Sedation  Physician/patient face-to-face sedation start time: 8:53 am  Physician/patient face-to-face sedation stop time: 9:08 am  Total moderate sedation time in minutes: 15  minutes  Patient was monitored continuously 1:1 throughout the entire procedure while sedation was administered    Malampatti II    Medications  Versed 2 mg IV push  Fentanyl 25 µg IV push    The scope was passed with ease via the left nares. Upper airway appeared normal as did vocal cords. Bronchoscope was inserted into the trachea and shortly thereafter patient had an oxygen desaturation to 81% before any BAL or biopsies were attempted. End-tidal CO2 was normal and patient had normal respiratory rate. 100% nonrebreather was needed over top and high flow nasal cannula at 15 L to return the patient's oxygen saturation to > 90% A complete airway inspection was performed. Patient developed diffuse petechial hemorrhages with even the least bronchoscopic trauma. Platelets this morning 126 And INR yesterday was 1.21. Patient is on subcutaneous heparin 5000 units subcutaneous every 8 hours for DVT prophylaxis but is on no anticoagulation or antiplatelets. BUN is 57 and creatinine 3.6 so patient's platelets qualitatively may be impaired     No endobronchial lesions were identified. There were minimal clear secretions encountered in the airways. A Bronchoalveolar lavage was obtained from the Lingula lobe with 120 ml instilled and 45 ml of clear fluid recovered. BAL was sent for Immunocompromise panel including cytology with PCP. I had intended to do brush biopsies and transbronchial forcep biopsies of left upper lobe, lingula, and left lower lobe.   However

## 2018-10-19 NOTE — PROGRESS NOTES
(Interpreting   [id number]) on 10/17/2018 at 04:47 PM    Pathology  Department of Pathology  FINAL SURGICAL PATHOLOGY REPORT  Patient Name: Jeromy Cedeño        Accession No:  [id number]   Age Sex:   1945    72 Y / M      Location:      [facility] 01  Account No:   [de-identified]                Collected:     2018  Med Rec No:    [id number]                Received:      2018  Attend Phys:   Dana Appiah MD            Completed:     2018  Perform Phys: Ochoa Nielson MD      FINAL DIAGNOSIS:    Right chest wall tumor, excisional biopsy:     - Involved by poorly differentiated non-small cell malignancy, most       consistent with epithelioid malignant mesothelioma.     - See Comment. COMMENT:   Examination of the excision specimen shows diffuse involvement  by a poorly differentiated non-small cell malignancy, consisting of  medium to large cells having vesicular chromatin, 1 to 2 nucleoli, and  round to slightly irregularly shaped nuclei. These cells are arranged in  sheets without a discernible architectural pattern having somewhat  vacuolated-appearing cytoplasm. Focal spindling of these cells is noted,  as well as punctate areas of necrosis. Mitotic figures are easily  identified. The tumor focally appears to invade foci of accompanying  pulmonary (alveolar) parenchyma. In order to further characterize this neoplasm, a panel of  immunoperoxidase stains was performed on a selected paraffin block  (pankeratin, CK-7, calretinin, D2-40, CK-5/6, CK-20, TTF-1, LCA, CD20,  HMB-45, and MART-1). The tumor was markedly positive with pankeratin,  CK-7, calretinin, with focal positivity being noted on the D2-40 (weak),  and patchy positivity noted on the CK-5/6. The tumor was negative for  CD20 and LCA (ruling out a lymphoproliferative process) and negative for  HMB-45 and MART-1 (excluding metastatic malignant melanoma).  The negative  TTF-1 argues against a pulmonary parenchyma

## 2018-10-20 ENCOUNTER — APPOINTMENT (OUTPATIENT)
Dept: GENERAL RADIOLOGY | Age: 73
DRG: 843 | End: 2018-10-20
Payer: MEDICARE

## 2018-10-20 LAB
ABO/RH: NORMAL
ANION GAP SERPL CALCULATED.3IONS-SCNC: 17 MMOL/L (ref 3–16)
ANISOCYTOSIS: ABNORMAL
ANTIBODY SCREEN: NORMAL
BASOPHILS ABSOLUTE: 0 K/UL (ref 0–0.2)
BASOPHILS RELATIVE PERCENT: 0 %
BLOOD BANK DISPENSE STATUS: NORMAL
BLOOD BANK DISPENSE STATUS: NORMAL
BLOOD BANK PRODUCT CODE: NORMAL
BLOOD BANK PRODUCT CODE: NORMAL
BPU ID: NORMAL
BPU ID: NORMAL
BUN BLDV-MCNC: 55 MG/DL (ref 7–20)
CALCIUM SERPL-MCNC: 8.7 MG/DL (ref 8.3–10.6)
CHLORIDE BLD-SCNC: 97 MMOL/L (ref 99–110)
CO2: 24 MMOL/L (ref 21–32)
CREAT SERPL-MCNC: 3.6 MG/DL (ref 0.8–1.3)
DESCRIPTION BLOOD BANK: NORMAL
DESCRIPTION BLOOD BANK: NORMAL
EOSINOPHILS ABSOLUTE: 0 K/UL (ref 0–0.6)
EOSINOPHILS RELATIVE PERCENT: 0.1 %
GFR AFRICAN AMERICAN: 20
GFR NON-AFRICAN AMERICAN: 17
GLUCOSE BLD-MCNC: 117 MG/DL (ref 70–99)
HCT VFR BLD CALC: 19.7 % (ref 40.5–52.5)
HCT VFR BLD CALC: 29.5 % (ref 40.5–52.5)
HEMOGLOBIN: 6.6 G/DL (ref 13.5–17.5)
HEMOGLOBIN: 9.9 G/DL (ref 13.5–17.5)
LYMPHOCYTES ABSOLUTE: 0.1 K/UL (ref 1–5.1)
LYMPHOCYTES RELATIVE PERCENT: 0.9 %
MACROCYTES: ABNORMAL
MCH RBC QN AUTO: 31.7 PG (ref 26–34)
MCHC RBC AUTO-ENTMCNC: 33.5 G/DL (ref 31–36)
MCV RBC AUTO: 94.7 FL (ref 80–100)
MONOCYTES ABSOLUTE: 0.2 K/UL (ref 0–1.3)
MONOCYTES RELATIVE PERCENT: 1.7 %
NEUTROPHILS ABSOLUTE: 11.2 K/UL (ref 1.7–7.7)
NEUTROPHILS RELATIVE PERCENT: 97.3 %
PDW BLD-RTO: 16.8 % (ref 12.4–15.4)
PLATELET # BLD: 90 K/UL (ref 135–450)
PMV BLD AUTO: 8.7 FL (ref 5–10.5)
POTASSIUM REFLEX MAGNESIUM: 4 MMOL/L (ref 3.5–5.1)
RBC # BLD: 2.08 M/UL (ref 4.2–5.9)
SLIDE REVIEW: ABNORMAL
SODIUM BLD-SCNC: 138 MMOL/L (ref 136–145)
TOXIC GRANULATION: PRESENT
WBC # BLD: 11.6 K/UL (ref 4–11)

## 2018-10-20 PROCEDURE — 2060000000 HC ICU INTERMEDIATE R&B

## 2018-10-20 PROCEDURE — 80048 BASIC METABOLIC PNL TOTAL CA: CPT

## 2018-10-20 PROCEDURE — 94760 N-INVAS EAR/PLS OXIMETRY 1: CPT

## 2018-10-20 PROCEDURE — 85018 HEMOGLOBIN: CPT

## 2018-10-20 PROCEDURE — 86850 RBC ANTIBODY SCREEN: CPT

## 2018-10-20 PROCEDURE — 2580000003 HC RX 258: Performed by: STUDENT IN AN ORGANIZED HEALTH CARE EDUCATION/TRAINING PROGRAM

## 2018-10-20 PROCEDURE — 86900 BLOOD TYPING SEROLOGIC ABO: CPT

## 2018-10-20 PROCEDURE — 6360000002 HC RX W HCPCS: Performed by: STUDENT IN AN ORGANIZED HEALTH CARE EDUCATION/TRAINING PROGRAM

## 2018-10-20 PROCEDURE — 36430 TRANSFUSION BLD/BLD COMPNT: CPT

## 2018-10-20 PROCEDURE — 36591 DRAW BLOOD OFF VENOUS DEVICE: CPT

## 2018-10-20 PROCEDURE — 94761 N-INVAS EAR/PLS OXIMETRY MLT: CPT

## 2018-10-20 PROCEDURE — 6370000000 HC RX 637 (ALT 250 FOR IP): Performed by: STUDENT IN AN ORGANIZED HEALTH CARE EDUCATION/TRAINING PROGRAM

## 2018-10-20 PROCEDURE — 86923 COMPATIBILITY TEST ELECTRIC: CPT

## 2018-10-20 PROCEDURE — P9047 ALBUMIN (HUMAN), 25%, 50ML: HCPCS | Performed by: STUDENT IN AN ORGANIZED HEALTH CARE EDUCATION/TRAINING PROGRAM

## 2018-10-20 PROCEDURE — 85025 COMPLETE CBC W/AUTO DIFF WBC: CPT

## 2018-10-20 PROCEDURE — 2700000000 HC OXYGEN THERAPY PER DAY

## 2018-10-20 PROCEDURE — P9016 RBC LEUKOCYTES REDUCED: HCPCS

## 2018-10-20 PROCEDURE — 85014 HEMATOCRIT: CPT

## 2018-10-20 PROCEDURE — 86901 BLOOD TYPING SEROLOGIC RH(D): CPT

## 2018-10-20 PROCEDURE — 71045 X-RAY EXAM CHEST 1 VIEW: CPT

## 2018-10-20 RX ORDER — 0.9 % SODIUM CHLORIDE 0.9 %
250 INTRAVENOUS SOLUTION INTRAVENOUS ONCE
Status: DISCONTINUED | OUTPATIENT
Start: 2018-10-20 | End: 2018-10-20

## 2018-10-20 RX ORDER — SODIUM CHLORIDE 9 MG/ML
INJECTION, SOLUTION INTRAVENOUS CONTINUOUS
Status: DISCONTINUED | OUTPATIENT
Start: 2018-10-20 | End: 2018-10-20

## 2018-10-20 RX ORDER — FUROSEMIDE 10 MG/ML
60 INJECTION INTRAMUSCULAR; INTRAVENOUS ONCE
Status: COMPLETED | OUTPATIENT
Start: 2018-10-20 | End: 2018-10-20

## 2018-10-20 RX ORDER — ALBUMIN (HUMAN) 12.5 G/50ML
25 SOLUTION INTRAVENOUS EVERY 6 HOURS
Status: COMPLETED | OUTPATIENT
Start: 2018-10-20 | End: 2018-10-21

## 2018-10-20 RX ORDER — SODIUM CHLORIDE 9 MG/ML
INJECTION, SOLUTION INTRAVENOUS
Status: DISCONTINUED
Start: 2018-10-20 | End: 2018-10-20

## 2018-10-20 RX ORDER — OLANZAPINE 2.5 MG/1
2.5 TABLET ORAL ONCE
Status: COMPLETED | OUTPATIENT
Start: 2018-10-20 | End: 2018-10-20

## 2018-10-20 RX ORDER — QUETIAPINE FUMARATE 25 MG/1
12.5 TABLET, FILM COATED ORAL NIGHTLY
Status: DISCONTINUED | OUTPATIENT
Start: 2018-10-20 | End: 2018-10-24 | Stop reason: HOSPADM

## 2018-10-20 RX ADMIN — ALBUMIN (HUMAN) 12.5 G: 0.25 INJECTION, SOLUTION INTRAVENOUS at 16:34

## 2018-10-20 RX ADMIN — CASTOR OIL AND BALSAM, PERU: 788; 87 OINTMENT TOPICAL at 20:39

## 2018-10-20 RX ADMIN — HEPARIN SODIUM 5000 UNITS: 5000 INJECTION INTRAVENOUS; SUBCUTANEOUS at 20:38

## 2018-10-20 RX ADMIN — ALBUMIN (HUMAN) 12.5 G: 0.25 INJECTION, SOLUTION INTRAVENOUS at 16:01

## 2018-10-20 RX ADMIN — FUROSEMIDE 60 MG: 10 INJECTION, SOLUTION INTRAMUSCULAR; INTRAVENOUS at 11:39

## 2018-10-20 RX ADMIN — Medication 10 ML: at 20:40

## 2018-10-20 RX ADMIN — ALBUMIN (HUMAN) 25 G: 0.25 INJECTION, SOLUTION INTRAVENOUS at 22:13

## 2018-10-20 RX ADMIN — SIMVASTATIN 40 MG: 40 TABLET, FILM COATED ORAL at 20:38

## 2018-10-20 RX ADMIN — FERROUS SULFATE TAB 325 MG (65 MG ELEMENTAL FE) 325 MG: 325 (65 FE) TAB at 16:36

## 2018-10-20 RX ADMIN — MORPHINE SULFATE 30 MG: 30 TABLET, FILM COATED, EXTENDED RELEASE ORAL at 08:21

## 2018-10-20 RX ADMIN — FLUTICASONE PROPIONATE 2 SPRAY: 50 SPRAY, METERED NASAL at 20:39

## 2018-10-20 RX ADMIN — CALCIUM 500 MG: 500 TABLET ORAL at 08:21

## 2018-10-20 RX ADMIN — QUETIAPINE FUMARATE 12.5 MG: 25 TABLET ORAL at 20:39

## 2018-10-20 RX ADMIN — SENNOSIDES AND DOCUSATE SODIUM 1 TABLET: 8.6; 5 TABLET ORAL at 08:21

## 2018-10-20 RX ADMIN — FERROUS SULFATE TAB 325 MG (65 MG ELEMENTAL FE) 325 MG: 325 (65 FE) TAB at 12:34

## 2018-10-20 RX ADMIN — LEVOFLOXACIN 500 MG: 5 INJECTION, SOLUTION INTRAVENOUS at 23:31

## 2018-10-20 RX ADMIN — OLANZAPINE 2.5 MG: 2.5 TABLET, FILM COATED ORAL at 16:36

## 2018-10-20 RX ADMIN — MULTIPLE VITAMINS W/ MINERALS TAB 1 TABLET: TAB at 08:21

## 2018-10-20 RX ADMIN — CASTOR OIL AND BALSAM, PERU: 788; 87 OINTMENT TOPICAL at 11:29

## 2018-10-20 RX ADMIN — HEPARIN SODIUM 5000 UNITS: 5000 INJECTION INTRAVENOUS; SUBCUTANEOUS at 12:34

## 2018-10-20 RX ADMIN — HEPARIN SODIUM 5000 UNITS: 5000 INJECTION INTRAVENOUS; SUBCUTANEOUS at 05:18

## 2018-10-20 RX ADMIN — MORPHINE SULFATE 30 MG: 30 TABLET, FILM COATED, EXTENDED RELEASE ORAL at 20:39

## 2018-10-20 RX ADMIN — SENNOSIDES AND DOCUSATE SODIUM 1 TABLET: 8.6; 5 TABLET ORAL at 20:45

## 2018-10-20 RX ADMIN — FERROUS SULFATE TAB 325 MG (65 MG ELEMENTAL FE) 325 MG: 325 (65 FE) TAB at 08:21

## 2018-10-20 RX ADMIN — ALBUMIN (HUMAN) 25 G: 0.25 INJECTION, SOLUTION INTRAVENOUS at 01:52

## 2018-10-20 ASSESSMENT — PAIN SCALES - GENERAL
PAINLEVEL_OUTOF10: 0

## 2018-10-20 ASSESSMENT — PAIN DESCRIPTION - PROGRESSION: CLINICAL_PROGRESSION: GRADUALLY IMPROVING

## 2018-10-20 ASSESSMENT — ACTIVITIES OF DAILY LIVING (ADL)
EFFECT OF PAIN ON DAILY ACTIVITIES: REST
EFFECT OF PAIN ON DAILY ACTIVITIES: REST

## 2018-10-20 NOTE — PROGRESS NOTES
Spoke with res doctor she states they will put in for 1 unit to transfuse and also for type and screen.  Nurse waiting for orders to populate so I can release them

## 2018-10-20 NOTE — PROGRESS NOTES
Medical residents paged at this time, hemoglobin 6.6 no standing orders to transfuse or a type and cross isnt ordered.  Waiting on a call back

## 2018-10-20 NOTE — PROGRESS NOTES
Hospitalist Progress Note      PCP: Conor Brown MD    Date of Admission: 10/16/2018    Chief Complaint:   Chief Complaint   Patient presents with    Fatigue    Fall           Subjective: This morning patient appears to be bit confused, little agitated. Cant obtained good review of systems  More hypoxic this morning. Denies any lightheadedness, shortness of breath. Hemoglobin had dropped this morning    Medications:  Reviewed    Infusion Medications    sodium chloride      dextrose       Scheduled Medications    QUEtiapine  12.5 mg Oral Nightly    albumin human  25 g Intravenous Q6H    VENELEX   Topical BID    therapeutic multivitamin-minerals  1 tablet Oral Daily    ferrous sulfate  325 mg Oral TID WC    ascorbic acid  100 mg Oral Daily    sennosides-docusate sodium  1 tablet Oral Q12H    calcium elemental  500 mg Oral Daily    simvastatin  40 mg Oral Nightly    morphine  30 mg Oral BID    sodium chloride flush  10 mL Intravenous 2 times per day    heparin (porcine)  5,000 Units Subcutaneous Q8H    levofloxacin  500 mg Intravenous Q48H     PRN Meds: fluticasone, acetaminophen, ondansetron, prochlorperazine, sodium chloride flush, magnesium hydroxide, ondansetron, glucose, dextrose, glucagon (rDNA), dextrose      Intake/Output Summary (Last 24 hours) at 10/20/18 1228  Last data filed at 10/20/18 1121   Gross per 24 hour   Intake          1099.42 ml   Output              750 ml   Net           349.42 ml       Physical Exam Performed:    /77   Pulse 102   Temp 97.6 °F (36.4 °C) (Oral)   Resp 24   Ht 5' 6\" (1.676 m)   Wt 147 lb 11.3 oz (67 kg)   SpO2 99%   BMI 23.84 kg/m²      Physical Exam   Constitutional: He appears well-developed. No distress. HENT:   Head: Normocephalic and atraumatic. Neck: Normal range of motion. Neck supple. Cardiovascular: Normal rate and regular rhythm. No murmur heard. Pulmonary/Chest: Effort normal. No respiratory distress. pericardial effusion. 6. Mild cardiomegaly. 7. Prior cholecystectomy. CT Head WO Contrast   Final Result      No evidence of acute intracranial abnormality. CT Cervical Spine WO Contrast   Final Result      No fracture seen. XR CHEST STANDARD (2 VW)   Final Result      No significant change right lower lung pleural parenchymal opacity      New left mid and lower lung opacity. Findings may relate to edema or pneumonia. VL Extremity Venous Bilateral    (Results Pending)           Assessment  1. Pneumonia BL, likely gram positive  2. Hypoxia   3. Anemia  4. Acute metabolic encephalopathy   5. UTI  6. MINDA  7. Age related debility   8. Lymphoma, mesothelioma  9. Edema         Plan  · Continue levofloxacin for pneumonia. Discussed with oncology it could be pneumonia versus edema versus lymphangitic carcinomatosis. Patient is status post bronchoscopy. We'll follow on the results. Continue abx for now. · Lasix x 1 for hypoxia. Repeat CXR  · Start low dose seroquel given confusion and prevent agitation. · PRBC transfusion for anemia. No bleeding , melena reported. · Urine cultures are growing klebsiella. Continue Levofloxacin. · MINDA unclear cause. Given hypoxia will stop IVF. Cr still elevated not much improved with IVF. Nephrology is following. · PT OT   · Discussed with oncology.         DVT Prophylaxis: Heparin   Diet: Dietary Nutrition Supplements: Standard High Calorie Oral Supplement  DIET GENERAL;  Code Status: Full Code    PT/OT Eval Status: Shi Dalton MD

## 2018-10-20 NOTE — PROGRESS NOTES
349.42 ml       Physical Exam Performed:    BP (!) 139/94   Pulse 105   Temp 97.2 °F (36.2 °C) (Oral)   Resp 23   Ht 5' 6\" (1.676 m)   Wt 147 lb 11.3 oz (67 kg)   SpO2 93%   BMI 23.84 kg/m²     General appearance: No apparent distress, appears stated age and cooperative. HEENT: Pupils equal, round, and reactive to light. Conjunctivae/corneas clear. Neck: Supple, with full range of motion. No jugular venous distention. Trachea midline. Respiratory:  Use of accessory muscles noted. Decreased breath sounds on right compared to left diffusely. Cardiovascular: Tachycardic and regular rhythm with normal S1/S2 without murmurs, rubs or gallops. Abdomen: Soft, non-tender, non-distended with normal bowel sounds. Musculoskeletal: 2+ BLE pitting edema. No clubbing, cyanosis. Full range of motion without deformity. Skin: Pallor. Skin texture, turgor normal.  No rashes or lesions. Neurologic:  Neurovascularly intact without any focal sensory/motor deficits. Cranial nerves: II-XII intact, grossly non-focal.  Psychiatric: Lethargic and confused at times. Answers questions and follows commands. Capillary Refill: Brisk,< 3 seconds   Peripheral Pulses: +2 palpable, equal bilaterally       Labs:   Recent Labs      10/18/18   0400  10/19/18   0420  10/20/18   0330   WBC  13.0*  21.2*  11.6*   HGB  7.1*  7.9*  6.6*   HCT  21.8*  24.5*  19.7*   PLT  111*  126*  90*     Recent Labs      10/18/18   0400  10/19/18   0420  10/20/18   0330   NA  139  140  138   K  3.8  3.8  4.1  4.0   CL  99  99  97*   CO2  25  24  24   BUN  65*  57*  55*   CREATININE  3.8*  3.6*  3.6*   CALCIUM  8.4  8.5  8.7   PHOS  4.4   --    --      No results for input(s): AST, ALT, BILIDIR, BILITOT, ALKPHOS in the last 72 hours.   Recent Labs      10/18/18   1253   INR  1.21*     Recent Labs      10/18/18   0400   CKTOTAL  75       Urinalysis:      Lab Results   Component Value Date    NITRU Negative 10/16/2018    WBCUA 6-10 10/16/2018    BACTERIA

## 2018-10-21 LAB
ANION GAP SERPL CALCULATED.3IONS-SCNC: 15 MMOL/L (ref 3–16)
ANISOCYTOSIS: ABNORMAL
BASOPHILS ABSOLUTE: 0 K/UL (ref 0–0.2)
BASOPHILS RELATIVE PERCENT: 0 %
BLOOD CULTURE, ROUTINE: NORMAL
BUN BLDV-MCNC: 59 MG/DL (ref 7–20)
CALCIUM SERPL-MCNC: 9.2 MG/DL (ref 8.3–10.6)
CHLORIDE BLD-SCNC: 99 MMOL/L (ref 99–110)
CO2: 26 MMOL/L (ref 21–32)
CREAT SERPL-MCNC: 3.6 MG/DL (ref 0.8–1.3)
CULTURE, BLOOD 2: NORMAL
CULTURE, RESPIRATORY: NORMAL
DOHLE BODIES: PRESENT
EOSINOPHILS ABSOLUTE: 0 K/UL (ref 0–0.6)
EOSINOPHILS RELATIVE PERCENT: 0 %
GFR AFRICAN AMERICAN: 20
GFR NON-AFRICAN AMERICAN: 17
GLUCOSE BLD-MCNC: 137 MG/DL (ref 70–99)
GRAM STAIN RESULT: NORMAL
HCT VFR BLD CALC: 26.2 % (ref 40.5–52.5)
HEMOGLOBIN: 8.7 G/DL (ref 13.5–17.5)
LYMPHOCYTES ABSOLUTE: 0.2 K/UL (ref 1–5.1)
LYMPHOCYTES RELATIVE PERCENT: 2 %
MAGNESIUM: 1.5 MG/DL (ref 1.8–2.4)
MCH RBC QN AUTO: 30.5 PG (ref 26–34)
MCHC RBC AUTO-ENTMCNC: 33.2 G/DL (ref 31–36)
MCV RBC AUTO: 91.9 FL (ref 80–100)
MONOCYTES ABSOLUTE: 0 K/UL (ref 0–1.3)
MONOCYTES RELATIVE PERCENT: 0 %
NEUTROPHILS ABSOLUTE: 10.9 K/UL (ref 1.7–7.7)
NEUTROPHILS RELATIVE PERCENT: 98 %
OVALOCYTES: ABNORMAL
PDW BLD-RTO: 18.6 % (ref 12.4–15.4)
PLATELET # BLD: 77 K/UL (ref 135–450)
PMV BLD AUTO: 8.8 FL (ref 5–10.5)
POTASSIUM REFLEX MAGNESIUM: 3.5 MMOL/L (ref 3.5–5.1)
RBC # BLD: 2.86 M/UL (ref 4.2–5.9)
SODIUM BLD-SCNC: 140 MMOL/L (ref 136–145)
WBC # BLD: 11.1 K/UL (ref 4–11)

## 2018-10-21 PROCEDURE — 6360000002 HC RX W HCPCS: Performed by: STUDENT IN AN ORGANIZED HEALTH CARE EDUCATION/TRAINING PROGRAM

## 2018-10-21 PROCEDURE — 36591 DRAW BLOOD OFF VENOUS DEVICE: CPT

## 2018-10-21 PROCEDURE — 6370000000 HC RX 637 (ALT 250 FOR IP): Performed by: STUDENT IN AN ORGANIZED HEALTH CARE EDUCATION/TRAINING PROGRAM

## 2018-10-21 PROCEDURE — 94760 N-INVAS EAR/PLS OXIMETRY 1: CPT

## 2018-10-21 PROCEDURE — 6360000002 HC RX W HCPCS

## 2018-10-21 PROCEDURE — 2060000000 HC ICU INTERMEDIATE R&B

## 2018-10-21 PROCEDURE — 80048 BASIC METABOLIC PNL TOTAL CA: CPT

## 2018-10-21 PROCEDURE — 99233 SBSQ HOSP IP/OBS HIGH 50: CPT | Performed by: INTERNAL MEDICINE

## 2018-10-21 PROCEDURE — P9047 ALBUMIN (HUMAN), 25%, 50ML: HCPCS | Performed by: STUDENT IN AN ORGANIZED HEALTH CARE EDUCATION/TRAINING PROGRAM

## 2018-10-21 PROCEDURE — 2580000003 HC RX 258: Performed by: STUDENT IN AN ORGANIZED HEALTH CARE EDUCATION/TRAINING PROGRAM

## 2018-10-21 PROCEDURE — 85025 COMPLETE CBC W/AUTO DIFF WBC: CPT

## 2018-10-21 PROCEDURE — 94664 DEMO&/EVAL PT USE INHALER: CPT

## 2018-10-21 PROCEDURE — 83735 ASSAY OF MAGNESIUM: CPT

## 2018-10-21 RX ORDER — METHYLPREDNISOLONE SODIUM SUCCINATE 125 MG/2ML
125 INJECTION, POWDER, LYOPHILIZED, FOR SOLUTION INTRAMUSCULAR; INTRAVENOUS DAILY
Status: DISCONTINUED | OUTPATIENT
Start: 2018-10-21 | End: 2018-10-23

## 2018-10-21 RX ORDER — MAGNESIUM SULFATE IN WATER 40 MG/ML
2 INJECTION, SOLUTION INTRAVENOUS ONCE
Status: COMPLETED | OUTPATIENT
Start: 2018-10-21 | End: 2018-10-21

## 2018-10-21 RX ADMIN — MAGNESIUM SULFATE HEPTAHYDRATE 2 G: 40 INJECTION, SOLUTION INTRAVENOUS at 10:35

## 2018-10-21 RX ADMIN — HEPARIN SODIUM 5000 UNITS: 5000 INJECTION INTRAVENOUS; SUBCUTANEOUS at 20:27

## 2018-10-21 RX ADMIN — HEPARIN SODIUM 5000 UNITS: 5000 INJECTION INTRAVENOUS; SUBCUTANEOUS at 03:59

## 2018-10-21 RX ADMIN — QUETIAPINE FUMARATE 12.5 MG: 25 TABLET ORAL at 20:27

## 2018-10-21 RX ADMIN — CASTOR OIL AND BALSAM, PERU: 788; 87 OINTMENT TOPICAL at 17:37

## 2018-10-21 RX ADMIN — FLUTICASONE PROPIONATE 2 SPRAY: 50 SPRAY, METERED NASAL at 20:21

## 2018-10-21 RX ADMIN — ALBUMIN (HUMAN) 25 G: 0.25 INJECTION, SOLUTION INTRAVENOUS at 03:58

## 2018-10-21 RX ADMIN — METHYLPREDNISOLONE SODIUM SUCCINATE 125 MG: 125 INJECTION, POWDER, FOR SOLUTION INTRAMUSCULAR; INTRAVENOUS at 17:06

## 2018-10-21 RX ADMIN — MORPHINE SULFATE 30 MG: 30 TABLET, FILM COATED, EXTENDED RELEASE ORAL at 20:27

## 2018-10-21 RX ADMIN — Medication 10 ML: at 20:21

## 2018-10-21 RX ADMIN — CASTOR OIL AND BALSAM, PERU: 788; 87 OINTMENT TOPICAL at 20:20

## 2018-10-21 ASSESSMENT — PAIN SCALES - GENERAL
PAINLEVEL_OUTOF10: 0

## 2018-10-22 ENCOUNTER — APPOINTMENT (OUTPATIENT)
Dept: VASCULAR LAB | Age: 73
DRG: 843 | End: 2018-10-22
Payer: MEDICARE

## 2018-10-22 LAB
ANION GAP SERPL CALCULATED.3IONS-SCNC: 14 MMOL/L (ref 3–16)
ANISOCYTOSIS: ABNORMAL
ASPERGILLUS GALACTO AG: NEGATIVE
ASPERGILLUS GALACTO INDEX: 0.23
BASOPHILS ABSOLUTE: 0 K/UL (ref 0–0.2)
BASOPHILS RELATIVE PERCENT: 0 %
BUN BLDV-MCNC: 59 MG/DL (ref 7–20)
CALCIUM SERPL-MCNC: 9.1 MG/DL (ref 8.3–10.6)
CHLORIDE BLD-SCNC: 99 MMOL/L (ref 99–110)
CO2: 26 MMOL/L (ref 21–32)
CREAT SERPL-MCNC: 3.7 MG/DL (ref 0.8–1.3)
EOSINOPHILS ABSOLUTE: 0 K/UL (ref 0–0.6)
EOSINOPHILS RELATIVE PERCENT: 0 %
GFR AFRICAN AMERICAN: 20
GFR NON-AFRICAN AMERICAN: 16
GLUCOSE BLD-MCNC: 145 MG/DL (ref 70–99)
HCT VFR BLD CALC: 29.3 % (ref 40.5–52.5)
HEMOGLOBIN: 9.7 G/DL (ref 13.5–17.5)
LYMPHOCYTES ABSOLUTE: 0 K/UL (ref 1–5.1)
LYMPHOCYTES RELATIVE PERCENT: 0 %
MACROCYTES: ABNORMAL
MCH RBC QN AUTO: 30.4 PG (ref 26–34)
MCHC RBC AUTO-ENTMCNC: 32.9 G/DL (ref 31–36)
MCV RBC AUTO: 92.2 FL (ref 80–100)
MONOCYTES ABSOLUTE: 0.3 K/UL (ref 0–1.3)
MONOCYTES RELATIVE PERCENT: 2 %
NEUTROPHILS ABSOLUTE: 15.7 K/UL (ref 1.7–7.7)
NEUTROPHILS RELATIVE PERCENT: 98 %
PDW BLD-RTO: 19 % (ref 12.4–15.4)
PLATELET # BLD: 87 K/UL (ref 135–450)
PMV BLD AUTO: 8.8 FL (ref 5–10.5)
POTASSIUM REFLEX MAGNESIUM: 3.9 MMOL/L (ref 3.5–5.1)
RBC # BLD: 3.18 M/UL (ref 4.2–5.9)
SODIUM BLD-SCNC: 139 MMOL/L (ref 136–145)
TEAR DROP CELLS: ABNORMAL
WBC # BLD: 16 K/UL (ref 4–11)

## 2018-10-22 PROCEDURE — 6360000002 HC RX W HCPCS: Performed by: STUDENT IN AN ORGANIZED HEALTH CARE EDUCATION/TRAINING PROGRAM

## 2018-10-22 PROCEDURE — G8997 SWALLOW GOAL STATUS: HCPCS

## 2018-10-22 PROCEDURE — 94761 N-INVAS EAR/PLS OXIMETRY MLT: CPT

## 2018-10-22 PROCEDURE — 80048 BASIC METABOLIC PNL TOTAL CA: CPT

## 2018-10-22 PROCEDURE — 99221 1ST HOSP IP/OBS SF/LOW 40: CPT | Performed by: NURSE PRACTITIONER

## 2018-10-22 PROCEDURE — 94760 N-INVAS EAR/PLS OXIMETRY 1: CPT

## 2018-10-22 PROCEDURE — 6370000000 HC RX 637 (ALT 250 FOR IP): Performed by: STUDENT IN AN ORGANIZED HEALTH CARE EDUCATION/TRAINING PROGRAM

## 2018-10-22 PROCEDURE — 2060000000 HC ICU INTERMEDIATE R&B

## 2018-10-22 PROCEDURE — 2580000003 HC RX 258: Performed by: STUDENT IN AN ORGANIZED HEALTH CARE EDUCATION/TRAINING PROGRAM

## 2018-10-22 PROCEDURE — 92526 ORAL FUNCTION THERAPY: CPT

## 2018-10-22 PROCEDURE — 93970 EXTREMITY STUDY: CPT

## 2018-10-22 PROCEDURE — 92610 EVALUATE SWALLOWING FUNCTION: CPT

## 2018-10-22 PROCEDURE — 2700000000 HC OXYGEN THERAPY PER DAY

## 2018-10-22 PROCEDURE — 6360000002 HC RX W HCPCS: Performed by: INTERNAL MEDICINE

## 2018-10-22 PROCEDURE — G8996 SWALLOW CURRENT STATUS: HCPCS

## 2018-10-22 PROCEDURE — 36591 DRAW BLOOD OFF VENOUS DEVICE: CPT

## 2018-10-22 PROCEDURE — 6360000002 HC RX W HCPCS

## 2018-10-22 PROCEDURE — 99233 SBSQ HOSP IP/OBS HIGH 50: CPT | Performed by: INTERNAL MEDICINE

## 2018-10-22 PROCEDURE — 85025 COMPLETE CBC W/AUTO DIFF WBC: CPT

## 2018-10-22 PROCEDURE — 94660 CPAP INITIATION&MGMT: CPT

## 2018-10-22 RX ORDER — FUROSEMIDE 10 MG/ML
20 INJECTION INTRAMUSCULAR; INTRAVENOUS ONCE
Status: COMPLETED | OUTPATIENT
Start: 2018-10-22 | End: 2018-10-22

## 2018-10-22 RX ADMIN — SIMVASTATIN 40 MG: 40 TABLET, FILM COATED ORAL at 20:37

## 2018-10-22 RX ADMIN — CALCIUM 500 MG: 500 TABLET ORAL at 08:26

## 2018-10-22 RX ADMIN — FUROSEMIDE 20 MG: 10 INJECTION, SOLUTION INTRAMUSCULAR; INTRAVENOUS at 11:27

## 2018-10-22 RX ADMIN — MORPHINE SULFATE 30 MG: 30 TABLET, FILM COATED, EXTENDED RELEASE ORAL at 20:37

## 2018-10-22 RX ADMIN — Medication 10 ML: at 10:07

## 2018-10-22 RX ADMIN — MORPHINE SULFATE 30 MG: 30 TABLET, FILM COATED, EXTENDED RELEASE ORAL at 10:19

## 2018-10-22 RX ADMIN — MULTIPLE VITAMINS W/ MINERALS TAB 1 TABLET: TAB at 08:26

## 2018-10-22 RX ADMIN — CASTOR OIL AND BALSAM, PERU: 788; 87 OINTMENT TOPICAL at 20:43

## 2018-10-22 RX ADMIN — FERROUS SULFATE TAB 325 MG (65 MG ELEMENTAL FE) 325 MG: 325 (65 FE) TAB at 17:43

## 2018-10-22 RX ADMIN — HEPARIN SODIUM 5000 UNITS: 5000 INJECTION INTRAVENOUS; SUBCUTANEOUS at 04:34

## 2018-10-22 RX ADMIN — FERROUS SULFATE TAB 325 MG (65 MG ELEMENTAL FE) 325 MG: 325 (65 FE) TAB at 08:26

## 2018-10-22 RX ADMIN — QUETIAPINE FUMARATE 12.5 MG: 25 TABLET ORAL at 20:37

## 2018-10-22 RX ADMIN — HEPARIN SODIUM 5000 UNITS: 5000 INJECTION INTRAVENOUS; SUBCUTANEOUS at 11:34

## 2018-10-22 RX ADMIN — SENNOSIDES AND DOCUSATE SODIUM 1 TABLET: 8.6; 5 TABLET ORAL at 20:37

## 2018-10-22 RX ADMIN — FERROUS SULFATE TAB 325 MG (65 MG ELEMENTAL FE) 325 MG: 325 (65 FE) TAB at 11:27

## 2018-10-22 RX ADMIN — CASTOR OIL AND BALSAM, PERU: 788; 87 OINTMENT TOPICAL at 11:38

## 2018-10-22 RX ADMIN — METHYLPREDNISOLONE SODIUM SUCCINATE 125 MG: 125 INJECTION, POWDER, FOR SOLUTION INTRAMUSCULAR; INTRAVENOUS at 08:26

## 2018-10-22 RX ADMIN — HEPARIN SODIUM 5000 UNITS: 5000 INJECTION INTRAVENOUS; SUBCUTANEOUS at 20:38

## 2018-10-22 RX ADMIN — SENNOSIDES AND DOCUSATE SODIUM 1 TABLET: 8.6; 5 TABLET ORAL at 08:25

## 2018-10-22 ASSESSMENT — PAIN SCALES - GENERAL
PAINLEVEL_OUTOF10: 0

## 2018-10-22 ASSESSMENT — ENCOUNTER SYMPTOMS
EYES NEGATIVE: 1
SHORTNESS OF BREATH: 1
GASTROINTESTINAL NEGATIVE: 1
ALLERGIC/IMMUNOLOGIC NEGATIVE: 1

## 2018-10-22 NOTE — PROGRESS NOTES
Nephrology Progress Note          SUBJECTIVE:  We are following this patient for MINDA on CKD. Admitted for falling. 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 Patient follows with Dr. Susan Hicks for CKD stage 3 with a baseline Scr previously of 1.2-1.4 in 2017. Since he's been diagnosed with mesothelioma he's undergone VATS with biopsy confirmation, and completed Pemetrexed + Cisplatin for 6 cycles; reportedly last cycle 2018. It appears his Scr has been trending up since starting therapy 2018. He follows with [facility]. He has not shown improvement from his respiratory status with treatment of presumptive pneumonia nor steroids fro possible pneumonitis. Concern for progression of mesothelioma. Palliative Care to discuss. Creatinine has been fairly stable and he has no oliguria. Currently denies dyspnea. Very weak  No CP. No cough or wheezing. No N/V, abd pain, or diarrhea. No F/C. Physical Exam:    VITALS:  /78   Pulse 81   Temp 97.5 °F (36.4 °C) (Oral)   Resp 20   Ht 5' 6\" (1.676 m)   Wt 156 lb 8.4 oz (71 kg)   SpO2 99%   BMI 25.26 kg/m²   TEMPERATURE:  Current - Temp: 97.5 °F (36.4 °C);  Max - Temp  Av.4 °F (36.3 °C)  Min: 96.4 °F (35.8 °C)  Max: 98.2 °F (36.8 °C)  PULSE RANGE: Pulse  Av.4  Min: 81  Max: 92  BLOOD PRESSURE RANGE:  Systolic (06ZIZ), RZU:105 , Min:96 , CMJ:550   ; Diastolic (48JZR), XXC:35, Min:65, Max:78    PULSE OXIMETRY RANGE: SpO2  Av.5 %  Min: 94 %  Max: 100 %  24HR INTAKE/OUTPUT:    Intake/Output Summary (Last 24 hours) at 10/22/18 1107  Last data filed at 10/22/18 1005   Gross per 24 hour   Intake              700 ml   Output              835 ml   Net             -135 ml       Constitutional:  Chronically ill, weak  HEENT:  No

## 2018-10-22 NOTE — PROGRESS NOTES
Exceptions: Wears glasses at all times  Hearing  Hearing: Exceptions to [facility]  Hearing Exceptions: Hard of hearing/hearing concerns    Oral Motor Deficits  Oral/Motor  Oral Motor: Within functional limits    Oral Phase Dysfunction    pt took very small bites of cracker- noted to have munch like pattern vs rotary chew with first few trials. When gave pt a larger piece of cracker, mastication more normal.          Indicators of Pharyngeal Phase Dysfunction     Pt demonstrated no s/s aspiration with single sips of water by cup and straw, cracker, applesauce, ice chips and meds with water. Pt able to initiate swallow in a timely manner with good laryngeal elevation. Vocal quality remained clear throughout. Pt had one instance of coughing when pt was instructed to take successive swallows of water by straw, but not by cup. Pt took meds with large sips of water without difficulty. Pt did not become SOB with any trial. Predict that these coughing episodes while eating were a result with the pt having difficulty coordinating breathing with swallowing. Since that time, the RN reported pt's oxygen requirements have decreased. Prognosis  Prognosis  Prognosis for safe diet advancement: good  Individuals consulted  Consulted and agree with results and recommendations: Patient;RN    Education  Patient Education: Role of SLP  Patient Education Response: Needs reinforcement      G-Code  SLP G-Codes  Functional Limitations: Swallowing  Swallow Current Status (): At least 20 percent but less than 40 percent impaired, limited or restricted  Swallow Goal Status ():  At least 1 percent but less than 20 percent impaired, limited or restricted         Therapy Time  SLP Individual Minutes  Time In: 9085  Time Out: 6751  Minutes: 26         Pt's goal:pt denied difficulty with swallowing so did not state goal       Plan:  Continue goals per POC  Recommended diet:regular with thin liquids- Make NPO if s/s aspiration emerge and

## 2018-10-22 NOTE — PLAN OF CARE
Patients O2 sat levels were between 88-99 throughout shift. MD notified.  Patient has been stable at 8.5 L of high flow O2 with his O2 sat between 91-94
Problem: Falls - Risk of:  Goal: Will remain free from falls  Will remain free from falls   Outcome: Ongoing   + High Fall Risk per MARCELINO/ABCDS: Explained fall risk precautions to pt and family and rationale behind their use to keep the patient safe. Pt bed is in low position, side rails up, call light and belongings are in reach. Fall wristband applied and present on pts wrist.  Bed alarm on. Pt encouraged to call for assistance. Will continue with hourly rounds for PO intake, pain needs, toileting and repositioning as needed. Problem: Fluid Volume - Deficit:  Goal: Achieves intake and output within specified parameters  Achieves intake and output within specified parameters   Outcome: Ongoing  Pt had 1 urine occurrence and missed hat. Strict I/Os in place. +1 BLE edema present. Will continue to monitor closely. Problem: Gas Exchange - Impaired:  Goal: Levels of oxygenation will improve  Levels of oxygenation will improve   Outcome: Ongoing  Pt currently on 2L nasal cannula. Sats remain above 92% at this time. Will attempt to wean as tolerated. Problem: Infection - Central Venous Catheter-Associated Bloodstream Infection:  Goal: Will show no infection signs and symptoms  Will show no infection signs and symptoms   Outcome: Ongoing  CVC site remains free of signs/symptoms of infection. No drainage, edema, erythema, pain, or warmth noted at site. Dressing changes continue per protocol and on an as needed basis - see flowsheet. Problem: Venous Thromboembolism:  Goal: Will show no signs or symptoms of venous thromboembolism  Will show no signs or symptoms of venous thromboembolism   Outcome: Ongoing  SQ heparin ordered prophylactic. Pt educated and tolerating well. Problem: Skin Integrity:  Goal: Absence of new skin breakdown  Absence of new skin breakdown   Outcome: Ongoing  Upon admission, St 2 pressure wound noted to pt's coccyx. 4 eyes skin assessment completed -refer to note.  Wound consult
Problem: Falls - Risk of:  Goal: Will remain free from falls  Will remain free from falls   Outcome: Ongoing  Resting in bed, no attempts to get out of bed. Bed alarm on. AVASYS non-recording camera in use. Will update as needed. Problem: Gas Exchange - Impaired:  Goal: Levels of oxygenation will improve  Levels of oxygenation will improve   Outcome: Ongoing  Sats holding >95% on 50% at 20 Lpm. Less confusion, less shortness of breath on exertion. Not desating with turns or eating/drinking. Will update as needed. Problem: Infection - Central Venous Catheter-Associated Bloodstream Infection:  Goal: Will show no infection signs and symptoms  Will show no infection signs and symptoms   Outcome: Ongoing  Afebrile at this time. Changing plastics and caps per protocol. Will update as needed. Problem: Venous Thromboembolism:  Goal: Will show no signs or symptoms of venous thromboembolism  Will show no signs or symptoms of venous thromboembolism   Outcome: Ongoing  Heparin subcutaeous for DVT prevention. No s/s of calf pain. Will update as needed. Problem: Skin Integrity:  Goal: Absence of new skin breakdown  Absence of new skin breakdown   Outcome: Ongoing  No new s/s of skin breakdown. Heels elevated up off bed. Will update as needed. Problem: Pain:  Goal: Control of chronic pain  Control of chronic pain   Outcome: Ongoing  Denies pain at this time. Will update as needed.
Problem: Musculor/Skeletal Functional Status  Intervention: PT Evaluation/treatment  Increase independence with functional mobility and gait back to baseline.
placement. Will continue to monitor. Comment: Klein placed per MD order for supposed urinary retention. Will continue to monitor.

## 2018-10-22 NOTE — PROGRESS NOTES
[facility])                                                                                                                                 Nephrology Progress note   10/21/2018 8:44 PM     Patient: Guerita Ziegler [id number]  [id number]  Date of Admit: 10/16/2018 LOS: 5 days      Chief complaint      MINDA/CKD      History of Present Illness   Guerita Ziegler is a 68 y.o. 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 Patient follows with Dr. Laimne Quesada for CKD stage 3 with a baseline Scr previously of 1.2-1.4 in 2017. Last seen by Dr. Lamine Quesada 7/12/2018 and Scr was up 1.9. Lisinopril was held. Since completing R-CHOP therapy in 9/2017 for NHL his Cr has been as low as 1.0 (5/2018) suspected due to loss of muscle mass? Since he's been diagnosed with mesothelioma he's undergone VATS with biopsy confirmation, and completed Pemetrexed + Cisplatin for 6 cycles; reportedly last cycle 9/2018. It appears his Scr has been trending up since starting therapy 4/2018. He follows with [facility]. Serum creat 3.6 (stable vs 11/20 am); not oliguric.   Notes no dyspnea (on O2)      Vital Signs     Vitals:    10/21/18 1200 10/21/18 1519 10/21/18 1745 10/21/18 2006   BP: 118/68  101/72    Pulse: 92      Resp:  18 20 18   Temp: 98 °F (36.7 °C)  98.2 °F (36.8 °C)    TempSrc: Oral  Axillary    SpO2: 96% 100% 96% 94%   Weight:       Height:         Wt Readings from Last 3 Encounters:   10/21/18 157 lb 13.6 oz (71.6 kg)   09/13/18 146 lb 3.2 oz (66.3 kg)   06/26/18 136 lb 9.6 oz (62 kg)     Admit Wt: Weight: 146 lb (66.2 kg)   Todays Wt: Weight: 157 lb 13.6 oz (71.6 kg)  Average, Min, and Max for last 24 hours Vitals:  TEMPERATURE:  Temp  Av.8 °F (36.6 °C)  Min: 97.4 °F (36.3 °C)  Max: 98.2 °F (36.8 °C)  RESPIRATIONS RANGE: Resp  Av.7  Min: 14  Max: 20  PULSE RANGE: Pulse  Av.7  Min: 74  Max: 94  BLOOD PRESSURE RANGE:    Systolic (71WVF), PYM:130 , Min:93 , IXR:569     Diastolic (57VLB), OGQ:91, Min:63, Max:75        Date 10/21/18 0000 - 10/21/18 2359   Shift  9233-3241 7708-6770 24 Hour Total   I  N  T  A  K  E   P.O.  (mL/kg/hr) 0  (0) 100  (0.2)  100    IV Piggyback  (mL/kg) 100  (1.4)   100  (1.4)    Shift Total  (mL/kg) 100  (1.4) 100  (1.4)  200  (2.8)   O  U  T  P  U  T   Urine  (mL/kg/hr) 300  (0.5)   300    Emesis/NG output  (mL/kg) 0  (0)   0  (0)    Other  (mL/kg) 0  (0)   0  (0)    Stool  (mL/kg) 0  (0)   0  (0)    Blood  (mL/kg) 0  (0)   0  (0)    Shift Total  (mL/kg) 300  (4.2)   300  (4.2)   Weight (kg) 71.6 71.6 71.6 71.6       Intake/Output Summary (Last 24 hours) at 10/21/18 2044  Last data filed at 10/21/18 1435   Gross per 24 hour   Intake              300 ml   Output              500 ml   Net             -200 ml     I/O last 3 completed shifts: In: 300 [P.O.:100; IV Piggyback:200]  Out: 750 [Urine:750]      Physical Exam     General Appearance NAD, Alert. Frail. Cardiovascular Reg rhythm   Respiratory Decreased lung sounds at bases   MSK No clubbing or cyanosis,  warm well perfused, 2+ LE edema.     Klein in place        Neck   Trachea midline; supple       Abdomen Soft, NT/ND, BS present   Skin No rashes seen   Neurologic No tremors, no asterixis, no myoclonus    Psychiatric Alert,  some level of impaired insight and judgment        Laboratory Data     Recent Labs      10/19/18   0420  10/20/18   0330  10/20/18   1630  10/21/18   0350   WBC  21.2*  11.6*   --   11.1*   HGB  7.9*  6.6*  9.9*  8.7*   HCT  24.5*  19.7*  29.5*  26.2*   MCV  97.5  94.7   --   91.9   PLT  126*  90*   --   77*     Recent Labs      10/19/18   0420

## 2018-10-22 NOTE — PROGRESS NOTES
infectious. Bronch on 10/19  Lower extremity US show distended and pulsatile veins suggestive of fluid overload  MINDA    Continue solu medrol  Lasix - one dose given  Wean FiO2 as tolerated to keep sats >90.     Would d/c ABX after today's dose     Jose Lee MD

## 2018-10-22 NOTE — PROGRESS NOTES
Internal Medicine PGY-1 Resident Progress Note        PCP: Candis Garcia MD    Date of Admission: 10/16/2018    Chief Complaint: Fall    Subjective: Ryan Catalan    Met pt at bedside. He was eating. SpO2 100% on 20L vapotherm    He reports no more cough. No fever, chills, HA, dizziness, lightheadedness, chest pain, chest tightness, palpitations, N/V, normal BM yesterday brown & formed, no dysuria itching or burning on urination. VS WNL    Hb: 9.7 (yest 8.7)  WBC: 16 (yest 11.1; solumedrol started 10/21)  Cr: 3.7 (yest 3.6; BL 1.2-1.4 2017)    AM-PAC  OT: 21/24  PT: no score recorded    SLP recs, regular diet w/ thin liquids - make NPO if s/s aspiration  SW: need pre-cert; possible d/c ARU    Final Levofloxacin dose tonight  Palliative are consulted. History Of Present Illness:  68 y.o. male w/ H/o large cell lymphoma, mesothelioma (last chemo 3 wks ago), CKD III, DM presenting with mechanical fall. He was at [facility] coming out of fluid treatment (1L), felt good and when waking out he fell over the curb. At the time he was using his walker. His toe hit the curb and his body fell over the top of his walker. He was not seriously hurt, no LOC, he remembers the fall. He tried to catch himself when fell and bruised his knee.      He has had one other recent fall which occured earlier this morning. He was feeling weak getting off the toilet - has a  bar in the shower but got weak and could not reach it leading to the fall this morning. He remembers falling, no lightheadedness or dizziness, no SOB. No chest pain, palpitations, or tightness. No diaphoresis, no nausea, no arm or jaw pain.     Before today, his last fall was a while back.      When falling, he reports no auras, dizziness, lightheadedness, LOC. He only felt weak and fell. He receives fluids almost monthly at [facility] for feeling weak. Feels better after he gets fluid.     No fever, chills, night sweats.  No nausea, abdominal pain, diarrhea, changes in BM. No black stool, red stool, difficulty with urination. No itching/ burning on urination. No increase in frequency on urination. No new cough.      No history of smoking. No alcohol use. Medications:  Reviewed    Infusion Medications    dextrose       Scheduled Medications    methylPREDNISolone  125 mg Intravenous Daily    QUEtiapine  12.5 mg Oral Nightly    VENELEX   Topical BID    therapeutic multivitamin-minerals  1 tablet Oral Daily    ferrous sulfate  325 mg Oral TID WC    ascorbic acid  100 mg Oral Daily    sennosides-docusate sodium  1 tablet Oral Q12H    calcium elemental  500 mg Oral Daily    simvastatin  40 mg Oral Nightly    morphine  30 mg Oral BID    sodium chloride flush  10 mL Intravenous 2 times per day    heparin (porcine)  5,000 Units Subcutaneous Q8H    levofloxacin  500 mg Intravenous Q48H     PRN Meds: fluticasone, acetaminophen, ondansetron, prochlorperazine, sodium chloride flush, magnesium hydroxide, ondansetron, glucose, dextrose, glucagon (rDNA), dextrose      Intake/Output Summary (Last 24 hours) at 10/22/18 0601  Last data filed at 10/22/18 0424   Gross per 24 hour   Intake              220 ml   Output              675 ml   Net             -455 ml       Physical Exam Performed:    BP 96/66   Pulse 85   Temp 97.3 °F (36.3 °C) (Infrared)   Resp 14   Ht 5' 6\" (1.676 m)   Wt 156 lb 8.4 oz (71 kg)   SpO2 96%   BMI 25.26 kg/m²     General appearance: No apparent distress, appears stated age and cooperative. HEENT: Pupils equal, round, and reactive to light. Conjunctivae/corneas clear. Neck: Supple, with full range of motion. No jugular venous distention. Trachea midline. Respiratory:  Normal respiratory effort. Clear to auscultation, bilaterally without Rales/Wheezes/Rhonchi. Cardiovascular: Regular rate and rhythm with normal S1/S2 without murmurs, rubs or gallops. Abdomen: Soft, non-tender, non-distended with normal bowel sounds.   Musculoskeletal: No clubbing, yesterday with note made of easy bleeding with minimal trauma to mucosa that prevented biopsy. - 1u PRBC transfused (10/20)  - FOBT to r/o GIB    Confusion:  - seroquel 12.5 mg QHS    Acute simple cystitis - UA +. Loaded with Levaquin in ED.   - UCx pos for Klebsiella PNA  - Continue Levaquin 500 mg IV q48h  - bladder scan pending    MINDA on CKD3 (cisplatin toxicity vs volume depletion vs ATN 2/2 hypotension/UTI/PNA) - Baseline Cr 1.2 - 1.4 (2017). Pt has 2+ b/l LE pitting edema  - Cr 3.8 (up from 3.5 on admission)  - Nephrology folowing  -- per nephrology:   -- UA consistent w/ ATN  -- no diuretics for LE edema    Lower extremity edema: HF unlikely: LE Edema + BNP (672)  - echo (10/17): Normal left ventricle size, wall thickness, and systolic function with an  estimated ejection fraction of 55-60%.  No regional wall motion abnormalities are seen.  Diastolic function is indeterminate.  Mild tricuspid regurgitation.  IVC size is normal (<2.1 cm) but collapses < 50% with respiration consistent  with elevated RA pressure (8 mmHg).   Estimated pulmonary artery systolic pressure is at 30 mmHg assuming a right atrial pressure of 8 mmHg.   - per nephro: no diuretics    HLD  - simvastatin 40 mg QH     large cell lymphoma, malignant mesothelioma  - oncology  - morphine 30 mg BID    DVT Prophylaxis: SCDs  Diet: Dietary Nutrition Supplements: Standard High Calorie Oral Supplement  DIET GENERAL;  Code Status: Full Code  Dispo - GMF/BCC    PT/OT Eval Status: pending    I will discuss the patient with the senior resident and MD Flory Rosales MD   Internal Medicine Resident PGY-1  Pager: [phone]

## 2018-10-23 PROBLEM — E44.0 MODERATE MALNUTRITION (HCC): Chronic | Status: ACTIVE | Noted: 2018-10-23

## 2018-10-23 PROBLEM — E44.0 MODERATE MALNUTRITION (HCC): Status: ACTIVE | Noted: 2018-10-23

## 2018-10-23 LAB
ANION GAP SERPL CALCULATED.3IONS-SCNC: 14 MMOL/L (ref 3–16)
BASOPHILS ABSOLUTE: 0 K/UL (ref 0–0.2)
BASOPHILS RELATIVE PERCENT: 0 %
BUN BLDV-MCNC: 64 MG/DL (ref 7–20)
CALCIUM SERPL-MCNC: 9.1 MG/DL (ref 8.3–10.6)
CHLORIDE BLD-SCNC: 99 MMOL/L (ref 99–110)
CO2: 27 MMOL/L (ref 21–32)
CREAT SERPL-MCNC: 3.9 MG/DL (ref 0.8–1.3)
EOSINOPHILS ABSOLUTE: 0 K/UL (ref 0–0.6)
EOSINOPHILS RELATIVE PERCENT: 0 %
GFR AFRICAN AMERICAN: 18
GFR NON-AFRICAN AMERICAN: 15
GLUCOSE BLD-MCNC: 138 MG/DL (ref 70–99)
HCT VFR BLD CALC: 29.3 % (ref 40.5–52.5)
HEMOGLOBIN: 9.7 G/DL (ref 13.5–17.5)
LYMPHOCYTES ABSOLUTE: 0.2 K/UL (ref 1–5.1)
LYMPHOCYTES RELATIVE PERCENT: 1 %
MCH RBC QN AUTO: 30.6 PG (ref 26–34)
MCHC RBC AUTO-ENTMCNC: 32.9 G/DL (ref 31–36)
MCV RBC AUTO: 92.8 FL (ref 80–100)
MONOCYTES ABSOLUTE: 0.2 K/UL (ref 0–1.3)
MONOCYTES RELATIVE PERCENT: 1 %
NEUTROPHILS ABSOLUTE: 17.3 K/UL (ref 1.7–7.7)
NEUTROPHILS RELATIVE PERCENT: 98 %
PDW BLD-RTO: 18.9 % (ref 12.4–15.4)
PLATELET # BLD: 89 K/UL (ref 135–450)
PMV BLD AUTO: 9 FL (ref 5–10.5)
POTASSIUM REFLEX MAGNESIUM: 3.8 MMOL/L (ref 3.5–5.1)
RBC # BLD: 3.16 M/UL (ref 4.2–5.9)
SODIUM BLD-SCNC: 140 MMOL/L (ref 136–145)
WBC # BLD: 17.7 K/UL (ref 4–11)

## 2018-10-23 PROCEDURE — 99233 SBSQ HOSP IP/OBS HIGH 50: CPT | Performed by: INTERNAL MEDICINE

## 2018-10-23 PROCEDURE — 94660 CPAP INITIATION&MGMT: CPT

## 2018-10-23 PROCEDURE — 6360000002 HC RX W HCPCS: Performed by: STUDENT IN AN ORGANIZED HEALTH CARE EDUCATION/TRAINING PROGRAM

## 2018-10-23 PROCEDURE — 2580000003 HC RX 258: Performed by: STUDENT IN AN ORGANIZED HEALTH CARE EDUCATION/TRAINING PROGRAM

## 2018-10-23 PROCEDURE — 36591 DRAW BLOOD OFF VENOUS DEVICE: CPT

## 2018-10-23 PROCEDURE — 6360000002 HC RX W HCPCS: Performed by: INTERNAL MEDICINE

## 2018-10-23 PROCEDURE — 2060000000 HC ICU INTERMEDIATE R&B

## 2018-10-23 PROCEDURE — 80048 BASIC METABOLIC PNL TOTAL CA: CPT

## 2018-10-23 PROCEDURE — 97530 THERAPEUTIC ACTIVITIES: CPT

## 2018-10-23 PROCEDURE — 6360000002 HC RX W HCPCS

## 2018-10-23 PROCEDURE — 6370000000 HC RX 637 (ALT 250 FOR IP): Performed by: STUDENT IN AN ORGANIZED HEALTH CARE EDUCATION/TRAINING PROGRAM

## 2018-10-23 PROCEDURE — 94761 N-INVAS EAR/PLS OXIMETRY MLT: CPT

## 2018-10-23 PROCEDURE — 6370000000 HC RX 637 (ALT 250 FOR IP): Performed by: INTERNAL MEDICINE

## 2018-10-23 PROCEDURE — 2700000000 HC OXYGEN THERAPY PER DAY

## 2018-10-23 PROCEDURE — 97535 SELF CARE MNGMENT TRAINING: CPT

## 2018-10-23 PROCEDURE — 97116 GAIT TRAINING THERAPY: CPT

## 2018-10-23 PROCEDURE — 85025 COMPLETE CBC W/AUTO DIFF WBC: CPT

## 2018-10-23 PROCEDURE — 94760 N-INVAS EAR/PLS OXIMETRY 1: CPT

## 2018-10-23 PROCEDURE — 92526 ORAL FUNCTION THERAPY: CPT

## 2018-10-23 RX ORDER — MIDODRINE HYDROCHLORIDE 5 MG/1
5 TABLET ORAL 2 TIMES DAILY WITH MEALS
Status: DISCONTINUED | OUTPATIENT
Start: 2018-10-23 | End: 2018-10-24 | Stop reason: HOSPADM

## 2018-10-23 RX ORDER — FUROSEMIDE 10 MG/ML
80 INJECTION INTRAMUSCULAR; INTRAVENOUS ONCE
Status: COMPLETED | OUTPATIENT
Start: 2018-10-23 | End: 2018-10-23

## 2018-10-23 RX ORDER — METHYLPREDNISOLONE SODIUM SUCCINATE 40 MG/ML
40 INJECTION, POWDER, LYOPHILIZED, FOR SOLUTION INTRAMUSCULAR; INTRAVENOUS DAILY
Status: DISCONTINUED | OUTPATIENT
Start: 2018-10-24 | End: 2018-10-24 | Stop reason: HOSPADM

## 2018-10-23 RX ADMIN — MORPHINE SULFATE 30 MG: 30 TABLET, FILM COATED, EXTENDED RELEASE ORAL at 09:42

## 2018-10-23 RX ADMIN — MIDODRINE HYDROCHLORIDE 5 MG: 5 TABLET ORAL at 11:01

## 2018-10-23 RX ADMIN — LEVOFLOXACIN 500 MG: 5 INJECTION, SOLUTION INTRAVENOUS at 00:02

## 2018-10-23 RX ADMIN — METHYLPREDNISOLONE SODIUM SUCCINATE 125 MG: 125 INJECTION, POWDER, FOR SOLUTION INTRAMUSCULAR; INTRAVENOUS at 09:41

## 2018-10-23 RX ADMIN — CALCIUM 500 MG: 500 TABLET ORAL at 09:42

## 2018-10-23 RX ADMIN — CASTOR OIL AND BALSAM, PERU: 788; 87 OINTMENT TOPICAL at 09:42

## 2018-10-23 RX ADMIN — QUETIAPINE FUMARATE 12.5 MG: 25 TABLET ORAL at 20:45

## 2018-10-23 RX ADMIN — Medication 10 ML: at 09:45

## 2018-10-23 RX ADMIN — SENNOSIDES AND DOCUSATE SODIUM 1 TABLET: 8.6; 5 TABLET ORAL at 20:45

## 2018-10-23 RX ADMIN — FERROUS SULFATE TAB 325 MG (65 MG ELEMENTAL FE) 325 MG: 325 (65 FE) TAB at 12:06

## 2018-10-23 RX ADMIN — Medication 10 ML: at 00:02

## 2018-10-23 RX ADMIN — SIMVASTATIN 40 MG: 40 TABLET, FILM COATED ORAL at 20:44

## 2018-10-23 RX ADMIN — HEPARIN SODIUM 5000 UNITS: 5000 INJECTION INTRAVENOUS; SUBCUTANEOUS at 12:07

## 2018-10-23 RX ADMIN — FERROUS SULFATE TAB 325 MG (65 MG ELEMENTAL FE) 325 MG: 325 (65 FE) TAB at 17:25

## 2018-10-23 RX ADMIN — MIDODRINE HYDROCHLORIDE 5 MG: 5 TABLET ORAL at 17:25

## 2018-10-23 RX ADMIN — FERROUS SULFATE TAB 325 MG (65 MG ELEMENTAL FE) 325 MG: 325 (65 FE) TAB at 09:42

## 2018-10-23 RX ADMIN — MULTIPLE VITAMINS W/ MINERALS TAB 1 TABLET: TAB at 09:41

## 2018-10-23 RX ADMIN — FUROSEMIDE 80 MG: 10 INJECTION, SOLUTION INTRAMUSCULAR; INTRAVENOUS at 12:06

## 2018-10-23 RX ADMIN — SENNOSIDES AND DOCUSATE SODIUM 1 TABLET: 8.6; 5 TABLET ORAL at 09:41

## 2018-10-23 RX ADMIN — HEPARIN SODIUM 5000 UNITS: 5000 INJECTION INTRAVENOUS; SUBCUTANEOUS at 20:45

## 2018-10-23 RX ADMIN — HEPARIN SODIUM 5000 UNITS: 5000 INJECTION INTRAVENOUS; SUBCUTANEOUS at 04:38

## 2018-10-23 ASSESSMENT — PAIN SCALES - GENERAL
PAINLEVEL_OUTOF10: 0

## 2018-10-23 ASSESSMENT — ENCOUNTER SYMPTOMS
ALLERGIC/IMMUNOLOGIC NEGATIVE: 1
EYES NEGATIVE: 1
SHORTNESS OF BREATH: 1
GASTROINTESTINAL NEGATIVE: 1

## 2018-10-23 NOTE — PROGRESS NOTES
a pulmonary parenchyma adenocarcinoma. The strong  calretinin positivity, in addition to the CK-5/6 and patchy D2-40  positivity supports the impression of malignant mesothelioma.     As part of this department's review process, this case has been  prospectively reviewed by multiple members of the department [facility] Pathologists) who are in agreement with the above  interpretation.    VAGJO/VAGJO    Problem List  Patient Active Problem List   Diagnosis    Hypertrophy of prostate without urinary obstruction    Vitamin D deficiency    Nodular prostate    Erectile dysfunction    Abnormal EKG    Essential hypertension, benign    Diabetes mellitus ([address]    Hyperlipidemia with target LDL less than 100    Severe obesity (BMI 35.0-35.9 with comorbidity)(diabetes) ([address]    CKD (chronic kidney disease) stage 3, GFR 30-59 ml/min (HCC)    Grade I diastolic dysfunction    Abnormal echocardiogram    S/P right hemicolectomy    Villoglandular adenoma    Retroperitoneal mass    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)    Orthostatic hypotension    Controlled type 2 diabetes mellitus with stage 3 chronic kidney disease, without long-term current use of insulin (HCC)    Anemia, iron deficiency, inadequate dietary intake    Cachexia ([address]    Chemotherapy induced nausea and vomiting    Hypotension due to drugs    Colon polyp    Drug induced insomnia (HCC)    Neoplastic malignant related fatigue    Pancytopenia (HCC)    Duodenal stricture    Acute pain of right shoulder    Pleural effusion    Hydropneumothorax    Mesothelioma:right    Severe malnutrition ([address]    Accident due to mechanical fall without injury    SOB (shortness of breath)    H/O malignant mesothelioma    Generalized pain    Goals of care, counseling/discussion    DNR (do not resuscitate) discussion    Encounter for palliative care    Hypoxia       Assessment and Plan:     Malignant Mesothelioma -

## 2018-10-23 NOTE — CARE COORDINATION
Palliative Care Chart Review  and Check in Note:     NAME:  So Chavira Date: 10/16/2018  Hospital Day:  Hospital Day: 8   Current Code status: Limited    Palliative care is continuing to following Mr. José Manuel Schwartz for symptom management, discharge planning and goals of care discussion as needed. Patient's chart reviewed today 10/23/18. The following are the currently established goals/code status, and discharge plan to date. PC NP and PCSW discussed patient prognosis with Dr. Huan Woodall and Dr. Tam Lucio this morning and hospice recommendation. NP and SW met with patient and spouse to discuss next steps and goals of care. Both patient and spouse expressed a clear understanding with patient stating \"I wish this wasn't how it was but hospice would be best\". Education provided on home hospice and agency choice provided. No preference, referral made to [facility]. Hospice admissions RN-Eugenia to meet with patient and spouse at 3pm.    Goals of care/Code staus: [place] DNR-CC form on chart. Discharge plan:   DC Wednesday home with [facility]. ADDENDUM 4:12 PM  Patient signed hospice consents this afternoon and plan for discharge home tomorrow. Dr. Victoria Jackson updated.      Maryann Hernandez MSW/VAIBHAVW  Palliative Care   [phone]

## 2018-10-23 NOTE — PROGRESS NOTES
 Vitamin D deficiency 03/09/2011    Nodular prostate 03/09/2011    Hypertrophy of prostate without urinary obstruction 06/16/2010     Allergies: Allergies   Allergen Reactions    Ultram [Tramadol] Nausea And Vomiting         Recent Chest Xray 10/20/18      No significant change in bilateral pleural-parenchymal opacities      CT head 10/16/18      No evidence of acute intracranial abnormality.         Previous MBS   No MBS on file    Chart reviewed. Medical Diagnosis: fall  Treatment Diagnosis: oropharyngeal dysphagia    BSE Impression 10/22/18  \"Per chart, pt had episode of coughing with liquids and solids last night. Also per chart, pt had venturi mask in place and would have episodes of desaturation when eating. Per 10/21 nursing note at 7:28 am \"This morning pt's oxygen desaturated while eating breakfast to the 70s but rebounds to 98% on venturi mask. Pt is A/O x 3 (not to month) and is not agitated today. Complains of not being able to eat and occasional difficulty breathing. \". On this date, pt sleeping, but woke easily. Pt on vapotherm with normal breathing pattern. Although pt had eyes opened during the assessment, pt not fully engaged. When asked pt if he was having difficulty with breathing during episodes when he was coughing while eating, pt did not respond. Pt was inconsistent with ability to follow commands and respond to questions. Oral- pt took very small bites of cracker- noted to have munch like pattern vs rotary chew with first few trials. When gave pt a larger piece of cracker, mastication more normal. Pharyngeal- Pt demonstrated no s/s aspiration with single sips of water by cup and straw, cracker, applesauce, ice chips and meds with water. Pt able to initiate swallow in a timely manner with good laryngeal elevation. Vocal quality remained clear throughout. Pt had one instance of coughing when pt was instructed to take successive swallows of water by straw, but not by cup.  Pt took

## 2018-10-23 NOTE — PROGRESS NOTES
Nephrology Progress Note          SUBJECTIVE:  We are following this patient for MINDA on CKD. Admitted for falling. past medical history of HTN, DM, HLD, R Hemicolectomy 10/2016 adenocarcinoma in situ, NH Lymphoma s/p RCHOP (2017), Recent Dx Mesothelioma,  who presented 10/16/2018  with mechanical falls to [facility] for whom we are consulted 10/17/18 for MINDA/CKD. Patient follows with Dr. Susan Hicks for CKD stage 3 with a baseline Scr previously of 1.2-1.4 in 2017. Since he's been diagnosed with mesothelioma he's undergone VATS with biopsy confirmation, and completed Pemetrexed + Cisplatin for 6 cycles; reportedly last cycle 2018. It appears his Scr has been trending up since starting therapy 2018. He follows with [facility]. No improvement from his respiratory status with treatment of presumptive pneumonia nor steroids for possible pneumonitis. Apparent progression of mesothelioma. Palliative Care following. Creatinine worse. Has dyspnea nd LE edema  No significant response to low dose lasix yesterday. No CP. No cough or wheezing. No N/V, abd pain, or diarrhea. No F/C. Physical Exam:    VITALS:  BP 86/66   Pulse 77   Temp 97.3 °F (36.3 °C) (Axillary)   Resp 20   Ht 5' 6\" (1.676 m)   Wt 156 lb 8.4 oz (71 kg)   SpO2 99%   BMI 25.26 kg/m²   TEMPERATURE:  Current - Temp: 97.3 °F (36.3 °C);  Max - Temp  Av.1 °F (36.7 °C)  Min: 97.3 °F (36.3 °C)  Max: 99.1 °F (37.3 °C)  PULSE RANGE: Pulse  Av  Min: 77  Max: 87  BLOOD PRESSURE RANGE:  Systolic (98GIJ), FKR:42 , Min:86 , FUN:194   ; Diastolic (47DLX), AKJ:02, Min:63, Max:75    PULSE OXIMETRY RANGE: SpO2  Av.4 %  Min: 95 %  Max: 100 %  24HR INTAKE/OUTPUT:      Intake/Output Summary (Last 24 hours) at 10/23/18 0901  Last data filed at 10/23/18 0500   Gross per 24 hour   Intake              340 ml   Output              575 ml   Net             -235 ml       Constitutional:  Chronically ill, weak  HEENT:  No oral

## 2018-10-23 NOTE — PROGRESS NOTES
Internal Medicine PGY-1 Resident Progress Note        PCP: John Mandel MD    Date of Admission: 10/16/2018    Chief Complaint: Fall    Subjective: Justice Forget    Met pt at bedside. He was eating. More conversant than usual.    He reports no fever, chills, HA, dizziness, lightheadedness, chest pain, chest tightness, palpitations, N/V, normal BM, no dysuria itching or burning on urination. SpO2 99% on 15L vapotherm (down from 20 L yesterday)  - BL 5L    VS WNL    Hb: 9.7 (yest 9.7)  WBC: 17.6 (yest 16; solumedrol started 10/21)  Cr: 3.9 trending up (yest 3.7; prior day 3.6; BL 1.2-1.4 2017)    Bronchial Bx:   -  No malignant cells identified. -  Macrophages with mild mixed inflammation  -  GMS stain is negative for fungal and pneumocystis jirovecii organisms. AM-PAC  OT: 21/24 (10/17)  PT: 17/24 (10/18)    SLP recs, regular diet w/ thin liquids - make NPO if s/s aspiration  SW: need pre-cert; possible d/c ARU    Levofloxacin stopped today  Palliative are consulted. History Of Present Illness:  68 y.o. male w/ H/o large cell lymphoma, mesothelioma (last chemo 3 wks ago), CKD III, DM presenting with mechanical fall. He was at [facility] coming out of fluid treatment (1L), felt good and when waking out he fell over the curb. At the time he was using his walker. His toe hit the curb and his body fell over the top of his walker. He was not seriously hurt, no LOC, he remembers the fall. He tried to catch himself when fell and bruised his knee.      He has had one other recent fall which occured earlier this morning. He was feeling weak getting off the toilet - has a  bar in the shower but got weak and could not reach it leading to the fall this morning. He remembers falling, no lightheadedness or dizziness, no SOB. No chest pain, palpitations, or tightness.  No diaphoresis, no nausea, no arm or jaw pain.     Before today, his last fall was a while back.      When falling, he reports no auras, dizziness,

## 2018-10-23 NOTE — PROGRESS NOTES
Nutrition Assessment    Type and Reason for Visit: Initial    Nutrition Recommendations:   · Continue General diet, monitor and encourage intake  · ONS declined, will dc ensure enlive   · Monitor weights, labs, and plan of care     Malnutrition Assessment:  · Malnutrition Status: Meets the criteria for moderate malnutrition  · Context: Chronic illness  · Findings of the 6 clinical characteristics of malnutrition (Minimum of 2 out of 6 clinical characteristics is required to make the diagnosis of moderate or severe Protein Calorie Malnutrition based on AND/ASPEN Guidelines):  1. Energy Intake-Less than or equal to 50%, greater than or equal to 5 days    2. Weight Loss-No significant weight loss,    3. Fat Loss-Moderate subcutaneous fat loss, Triceps  4. Muscle Loss-Moderate muscle mass loss, Temples (temporalis muscle), Clavicles (pectoralis and deltoids)  5. Fluid Accumulation-No significant fluid accumulation,    6.  Strength-Not measured    Nutrition Diagnosis:   · Problem: Inadequate oral intake  · Etiology: related to Catabolic illness     Signs and symptoms:  as evidenced by Diet history of poor intake, Patient report of, Weight loss    Nutrition Assessment:  · Subjective Assessment: Pt is being assessed for SIM castillo. He presents s/p fall. PMHx of Large cell lymphoma, CKD, DM, HLD, HTn, and Malignant mesothelioma w/ suspected prgoression of disease. He reports a good appetite w/ no c/o n/v/d/c; however, per I/o, he has been consuming 0-50% of his meals, and 1-2 meals/day at most. He declines any and all supplements stating that he does not like them. He does state alternate meal times would aid w/ intake. He reports a UBW of 240lb from a year and a half ago, but lately, his weight has been around 140lb. CBW of 156lb 8.4oz. No significant weight loss noted, per epic wt hx, even going back 1 1/2 years. UBW appears to range from 136-154lb.  RD will monitor intake adequacy   · Nutrition-Focused Physical Findings: LBM 10/17; +2 pitting edema BUE/BLE  · Wound Type: Stage II, Pressure Ulcer  · Current Nutrition Therapies:  · Oral Diet Orders: General   · Oral Diet intake: 0%, 1-25%, 26-50%  · Oral Nutrition Supplement (ONS) Orders: Standard High Calorie Oral Supplement  · ONS intake: Refused  · Anthropometric Measures:  · Ht: 5' 6\" (167.6 cm)   · Current Body Wt: 156 lb 8.4 oz (71 kg)  · Admission Body Wt: 146 lb (66.2 kg)  · Usual Body Wt: 140 lb (63.5 kg)  · Ideal Body Wt: 142 lb (64.4 kg),   · BMI Classification: BMI 25.0 - 29.9 Overweight  · Comparative Standards (Estimated Nutrition Needs):  · Estimated Daily Total Kcal: 1705-8480  · Estimated Daily Protein (g): 52-66    Estimated Intake vs Estimated Needs: Intake Less Than Needs    Nutrition Risk Level: Moderate    Nutrition Interventions:   Continue current diet (supplement refused )  Continued Inpatient Monitoring    Nutrition Evaluation:   · Evaluation: Goals set   · Goals: pt will tolerate and consume 75% or more of all meals offered     · Monitoring: Meal Intake, Diet Tolerance, Weight, Pertinent Labs    See Adult Nutrition Doc Flowsheet for more detail.      Electronically signed by Harjit Chen RD, PEYTON on 10/23/18 at 1:41 PM    LAXMI BRITO, PEYTON  Pager:  [phone]  Office:  [phone]
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
## 2018-10-24 ENCOUNTER — TELEPHONE (OUTPATIENT)
Dept: FAMILY MEDICINE CLINIC | Age: 73
End: 2018-10-24

## 2018-10-24 VITALS
DIASTOLIC BLOOD PRESSURE: 59 MMHG | SYSTOLIC BLOOD PRESSURE: 97 MMHG | HEART RATE: 95 BPM | BODY MASS INDEX: 26.61 KG/M2 | HEIGHT: 66 IN | RESPIRATION RATE: 18 BRPM | WEIGHT: 165.57 LBS | OXYGEN SATURATION: 100 % | TEMPERATURE: 98.5 F

## 2018-10-24 LAB
ANION GAP SERPL CALCULATED.3IONS-SCNC: 15 MMOL/L (ref 3–16)
BASOPHILS ABSOLUTE: 0 K/UL (ref 0–0.2)
BASOPHILS RELATIVE PERCENT: 0.2 %
BUN BLDV-MCNC: 67 MG/DL (ref 7–20)
CALCIUM SERPL-MCNC: 8.9 MG/DL (ref 8.3–10.6)
CHLORIDE BLD-SCNC: 99 MMOL/L (ref 99–110)
CO2: 28 MMOL/L (ref 21–32)
CREAT SERPL-MCNC: 3.9 MG/DL (ref 0.8–1.3)
EOSINOPHILS ABSOLUTE: 0 K/UL (ref 0–0.6)
EOSINOPHILS RELATIVE PERCENT: 0.1 %
FINAL REPORT: NORMAL
GFR AFRICAN AMERICAN: 18
GFR NON-AFRICAN AMERICAN: 15
GLUCOSE BLD-MCNC: 125 MG/DL (ref 70–99)
HCT VFR BLD CALC: 30.1 % (ref 40.5–52.5)
HEMOGLOBIN: 9.7 G/DL (ref 13.5–17.5)
LYMPHOCYTES ABSOLUTE: 0.2 K/UL (ref 1–5.1)
LYMPHOCYTES RELATIVE PERCENT: 1 %
MCH RBC QN AUTO: 30.4 PG (ref 26–34)
MCHC RBC AUTO-ENTMCNC: 32.3 G/DL (ref 31–36)
MCV RBC AUTO: 94.1 FL (ref 80–100)
MONOCYTES ABSOLUTE: 0.5 K/UL (ref 0–1.3)
MONOCYTES RELATIVE PERCENT: 3.1 %
NEUTROPHILS ABSOLUTE: 16.7 K/UL (ref 1.7–7.7)
NEUTROPHILS RELATIVE PERCENT: 95.6 %
PDW BLD-RTO: 18.9 % (ref 12.4–15.4)
PLATELET # BLD: 78 K/UL (ref 135–450)
PMV BLD AUTO: 8.6 FL (ref 5–10.5)
POTASSIUM REFLEX MAGNESIUM: 3.6 MMOL/L (ref 3.5–5.1)
PRELIMINARY: NORMAL
RBC # BLD: 3.19 M/UL (ref 4.2–5.9)
SLIDE REVIEW: ABNORMAL
SODIUM BLD-SCNC: 142 MMOL/L (ref 136–145)
WBC # BLD: 17.4 K/UL (ref 4–11)

## 2018-10-24 PROCEDURE — 94760 N-INVAS EAR/PLS OXIMETRY 1: CPT

## 2018-10-24 PROCEDURE — 6370000000 HC RX 637 (ALT 250 FOR IP): Performed by: STUDENT IN AN ORGANIZED HEALTH CARE EDUCATION/TRAINING PROGRAM

## 2018-10-24 PROCEDURE — 6370000000 HC RX 637 (ALT 250 FOR IP): Performed by: INTERNAL MEDICINE

## 2018-10-24 PROCEDURE — 6360000002 HC RX W HCPCS: Performed by: STUDENT IN AN ORGANIZED HEALTH CARE EDUCATION/TRAINING PROGRAM

## 2018-10-24 PROCEDURE — 80048 BASIC METABOLIC PNL TOTAL CA: CPT

## 2018-10-24 PROCEDURE — 92526 ORAL FUNCTION THERAPY: CPT

## 2018-10-24 PROCEDURE — 2700000000 HC OXYGEN THERAPY PER DAY

## 2018-10-24 PROCEDURE — 94761 N-INVAS EAR/PLS OXIMETRY MLT: CPT

## 2018-10-24 PROCEDURE — 85025 COMPLETE CBC W/AUTO DIFF WBC: CPT

## 2018-10-24 PROCEDURE — 2580000003 HC RX 258: Performed by: STUDENT IN AN ORGANIZED HEALTH CARE EDUCATION/TRAINING PROGRAM

## 2018-10-24 PROCEDURE — 6360000002 HC RX W HCPCS: Performed by: INTERNAL MEDICINE

## 2018-10-24 RX ORDER — CASTOR OIL AND BALSAM, PERU 788; 87 MG/G; MG/G
OINTMENT TOPICAL
Qty: 1 TUBE | Refills: 0 | Status: SHIPPED | OUTPATIENT
Start: 2018-10-24

## 2018-10-24 RX ORDER — QUETIAPINE FUMARATE 25 MG/1
12.5 TABLET, FILM COATED ORAL NIGHTLY
Qty: 60 TABLET | Refills: 3 | Status: SHIPPED | OUTPATIENT
Start: 2018-10-24

## 2018-10-24 RX ORDER — PREDNISONE 10 MG/1
TABLET ORAL
Qty: 7 TABLET | Refills: 0 | Status: SHIPPED | OUTPATIENT
Start: 2018-10-25 | End: 2018-10-31

## 2018-10-24 RX ORDER — MIDODRINE HYDROCHLORIDE 5 MG/1
5 TABLET ORAL 2 TIMES DAILY WITH MEALS
Qty: 90 TABLET | Refills: 3 | Status: SHIPPED | OUTPATIENT
Start: 2018-10-24

## 2018-10-24 RX ORDER — HEPARIN SODIUM (PORCINE) LOCK FLUSH IV SOLN 100 UNIT/ML 100 UNIT/ML
500 SOLUTION INTRAVENOUS PRN
Status: DISCONTINUED | OUTPATIENT
Start: 2018-10-24 | End: 2018-10-24 | Stop reason: HOSPADM

## 2018-10-24 RX ORDER — FUROSEMIDE 40 MG/1
40 TABLET ORAL 2 TIMES DAILY
Qty: 14 TABLET | Refills: 0 | Status: SHIPPED | OUTPATIENT
Start: 2018-10-24 | End: 2018-10-31

## 2018-10-24 RX ADMIN — MORPHINE SULFATE 30 MG: 30 TABLET, FILM COATED, EXTENDED RELEASE ORAL at 10:00

## 2018-10-24 RX ADMIN — FERROUS SULFATE TAB 325 MG (65 MG ELEMENTAL FE) 325 MG: 325 (65 FE) TAB at 09:00

## 2018-10-24 RX ADMIN — METHYLPREDNISOLONE SODIUM SUCCINATE 40 MG: 40 INJECTION, POWDER, FOR SOLUTION INTRAMUSCULAR; INTRAVENOUS at 09:01

## 2018-10-24 RX ADMIN — Medication 10 ML: at 09:01

## 2018-10-24 RX ADMIN — CALCIUM 500 MG: 500 TABLET ORAL at 09:00

## 2018-10-24 RX ADMIN — MULTIPLE VITAMINS W/ MINERALS TAB 1 TABLET: TAB at 09:00

## 2018-10-24 RX ADMIN — MIDODRINE HYDROCHLORIDE 5 MG: 5 TABLET ORAL at 09:00

## 2018-10-24 RX ADMIN — SENNOSIDES AND DOCUSATE SODIUM 1 TABLET: 8.6; 5 TABLET ORAL at 09:00

## 2018-10-24 RX ADMIN — Medication 500 UNITS: at 11:16

## 2018-10-24 RX ADMIN — HEPARIN SODIUM 5000 UNITS: 5000 INJECTION INTRAVENOUS; SUBCUTANEOUS at 04:42

## 2018-10-24 ASSESSMENT — PAIN SCALES - GENERAL: PAINLEVEL_OUTOF10: 0

## 2018-10-24 NOTE — CARE COORDINATION
SW aware of transport  this morning to home. Home w/ [facility].     RENEE Sidhu, [address] [place] [address]

## 2018-10-24 NOTE — PROGRESS NOTES
Nephrology Progress Note          SUBJECTIVE:  We are following this patient for MINDA on CKD. Admitted for falling. past medical history of HTN, DM, HLD, R Hemicolectomy 10/2016 adenocarcinoma in situ, [place] Lymphoma s/p [facility] (2017), Recent Dx Mesothelioma,  who presented 10/16/2018  with mechanical falls to [facility] for whom we are consulted 10/17/18 for MINDA/CKD. Patient follows with Dr. Darren Conrad for CKD stage 3 with a baseline Scr previously of 1.2-1.4 in 2017. Since he's been diagnosed with mesothelioma he's undergone VATS with biopsy confirmation, and completed Pemetrexed + Cisplatin for 6 cycles; reportedly last cycle 2018. It appears his Scr has been trending up since starting therapy 2018. He follows with [facility]. No improvement from his respiratory status with treatment of presumptive pneumonia nor steroids for possible pneumonitis. Apparent progression of mesothelioma. Palliative Care following. Witnessed fall when trying to get out of bed yesterday  No trauma  U/O a bit better  Creatinine stable    Plans for discharge home with hospice      Physical Exam:    VITALS:  BP (!) 97/59   Pulse 95   Temp 98.5 °F (36.9 °C) (Oral)   Resp 18   Ht 5' 6\" (1.676 m)   Wt 165 lb 9.1 oz (75.1 kg)   SpO2 100%   BMI 26.72 kg/m²   TEMPERATURE:  Current - Temp: 98.5 °F (36.9 °C);  Max - Temp  Av.9 °F (36.6 °C)  Min: 97.4 °F (36.3 °C)  Max: 98.5 °F (36.9 °C)  PULSE RANGE: Pulse  Av.2  Min: 74  Max: 106  BLOOD PRESSURE RANGE:  Systolic (53KMU), RWV:805 , Min:91 , FLY:804   ; Diastolic (51JMI), APJ:89, Min:54, Max:79    PULSE OXIMETRY RANGE: SpO2  Av.7 %  Min: 98 %  Max: 100 %  24HR INTAKE/OUTPUT:      Intake/Output Summary (Last 24 hours) at 10/24/18 1055  Last data filed at 10/23/18 2353   Gross per 24 hour   Intake             1440 ml   Output             1200 ml   Net              240 ml       Constitutional:  Chronically ill, weak  HEENT:  No oral lesions  Lungs:  Diminished BS bases, no wheeze  Cardiovascular:  RRR, no murmur  Abd:  Soft, NT  Ext:  Generalized 1+ LE and dependent edema  Skin:  No lesions  Neuro: non-focal      DATA:    CBC:   Recent Labs      10/22/18   0430  10/23/18   0444  10/24/18   0430   WBC  16.0*  17.7*  17.4*   RBC  3.18*  3.16*  3.19*   HGB  9.7*  9.7*  9.7*   HCT  29.3*  29.3*  30.1*   PLT  87*  89*  78*     BMP:    Recent Labs      10/22/18   0430  10/23/18   0444  10/24/18   0430   NA  139  140  142   K  3.9  3.8  3.6   CL  99  99  99   CO2  26  27  28   BUN  59*  64*  67*   CREATININE  3.7*  3.9*  3.9*   CALCIUM  9.1  9.1  8.9   GLUCOSE  145*  138*  125*     Phosphorus:  No results for input(s): PHOS in the last 72 hours. Magnesium:    No results for input(s): MG in the last 72 hours. IMPRESSION/RECOMMENDATIONS:      1. MINDA on CKD  Initial cisplatin effect effect but superimposed ATN  I discussed with  [address]: no further options for mesothelioma. Plans for hospice  Agree with lasix 40 mg biD at home  2. CKD stage 3 - baseline creatinine 1.2-1.4  Treated with Pemetrexed + Cisplatin for 6 cycles, last in Sept 2018. Creatinine worse since that time  3. Mesothelioma - VATS with biopsy confirmation. Completed Pemetrexed + Cisplatin   second-line/salvage therapy with pembrolizumab. Cycle 3 was delivered on Oct 4, 2018  progression of disease - for hospice  4. Hypotension - on midodrine  5. Respiratory failure - consideration for pneumonia or drug associated pneumonitis or even lymphangitic spread cancer  Suspected pneumonitis from pembrolizumab, steroid started 10/21/2018, no response  Now suspect progression of mesothelioma  6. UTI - Klebsiella. On Levquin  7. Anemia - hgb stable  8.  Past right hemicolectomy for cancer    John Mo MD

## 2018-10-24 NOTE — DISCHARGE SUMMARY
only felt weak and fell. He receives fluids almost monthly at [facility] for feeling weak. Feels better after he gets fluid.     No fever, chills, night sweats. No nausea, abdominal pain, diarrhea, changes in BM. No black stool, red stool, difficulty with urination. No itching/ burning on urination. No increase in frequency on urination. No new cough.      No history of smoking. No alcohol use. 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   - BCx: pending  - Lactate normal  - CBC daily   - Procalcitonin increased at 0.43  - Respiratory panel negative  - legionella and strep antigen in process  - CXR (10/16): Diffuse pleural thickening and loculation on the right, evident previously. Chronic atelectasis right base. Moderate groundglass airspace disease throughout the left lung with some peripheral sparing, possible pneumonia. Mild consolidation medially in the right lung base, new.  - CT: unilateral left sided groundglass opacity with subpleural sparing. Chronic right-sided volume loss with diffuse pleural thickening and dense parenchymal opacity, unchanged from previous CT Chest March 2018. Mild increase in mediastinal lymphadenopathy. Small pericardial effusion.  - oncology following  -- empirically tx infection; d/c abx per pulmonology (11/23)  -- bronch w/ BAL & biopsy:   ---  No malignant cells identified. --- Macrophages with mild mixed inflammation  --- GMS stain is negative for fungal and pneumocystis jirovecii organisms. - per Nephrology, Abx + diuretics failed; therefore, Solumederol 125 mg QD for pneumonitis (2/2 pembrolizumab):      Acute on chronic normocytic anemia 2/2 CKD vs chemo vs both vs other: Hb 6.6 (down from 7.9 prior day) uk source.  Did have a bronchoscopy yesterday with note made of easy bleeding with minimal trauma Final Result      XR CHEST PORTABLE   Final Result      No significant change in bilateral pleural-parenchymal opacities      US RENAL COMPLETE   Final Result   IMPRESSION :      Trace right perinephric fluid. Mild prostatomegaly. Otherwise unremarkable study. CT CHEST WO CONTRAST   Final Result      1. Diffuse pleural thickening and loculation on the right, evident previously. Chronic atelectasis right base. 2. Moderate groundglass airspace disease throughout the left lung with some peripheral sparing, possible pneumonia. 3. Mild consolidation medially in the right lung base, new. 4. Mild mediastinal adenopathy. 5. Small pericardial effusion. 6. Mild cardiomegaly. 7. Prior cholecystectomy. CT Head WO Contrast   Final Result      No evidence of acute intracranial abnormality. CT Cervical Spine WO Contrast   Final Result      No fracture seen. XR CHEST STANDARD (2 VW)   Final Result      No significant change right lower lung pleural parenchymal opacity      New left mid and lower lung opacity. Findings may relate to edema or pneumonia. Consults:     IP CONSULT TO ONCOLOGY  IP CONSULT TO HOSPITALIST  IP CONSULT TO NEPHROLOGY  IP CONSULT TO PALLIATIVE CARE  IP CONSULT TO PALLIATIVE CARE  IP CONSULT TO HOSPICE    Disposition:  hospice     Condition at Discharge: Stable    Discharge Instructions/Follow-up:      Code Status:  Limited     Activity: activity as tolerated    Diet: regular diet      Discharge Medications:     Current Discharge Medication List           Details   QUEtiapine (SEROQUEL) 25 MG tablet Take 0.5 tablets by mouth nightly  Qty: 60 tablet, Refills: 3      Balsam [place]-Castor Oil (VENELEX) OINT ointment Apply twice daily to coccyx  Qty: 1 Tube, Refills: 0      midodrine (PROAMATINE) 5 MG tablet Take 1 tablet by mouth 2 times daily (with meals) Do not give after 1800 or within 4 hrs of bedtime.   Qty: 90 tablet, Refills: 3

## 2018-10-24 NOTE — PROGRESS NOTES
swallows of water by straw, but not by cup. Pt took meds with large sips of water without difficulty. Pt did not become SOB with any trial. Predict that these coughing episodes while eating were a result with the pt having difficulty coordinating breathing with swallowing. Since that time, the RN reported pt's oxygen requirements have decreased. \"    MBS results   Not indicated at this time. Pain: denied    Current Diet : regular texture diet/thin liquids    Treatment:  Pt seen bedside to address the following goals:  1- The patient will tolerate recommended diet without observed clinical signs of aspiration  10/23: Pt seen sitting upright in bed; wife present at bedside. Pt wearing 10L O2--vapotherm; O2 sats remained >97% throughout session. Pt agreeable to trial of regular consistency chuckie cracker and thin liquids via cup sips. Pt took small size bites; prolonged mastication time noted and mildly decreased bolus formation. Pt utilized liquid rinse to clear oral residue. One dry cough when taking first bite of cracker; pt reported it was \"dry. \" No further concerns for aspiration with regular solids/thin liquids. No SOB noted or reported. Pt was able to swallow one pill from RN, whole with water, without difficulty or s/s aspiration. Continue goal.  10/24: Pt seen at bedside for dysphagia follow-up. Pt sitting upright in bed and was alert and cooperative, wearing 7L O2 via high flow nasal cannula. Agreeable to snack of chuckie crackers and thin liquid via straw. Pt eating at slow rate and taking small bites. Prolonged mastication time with munch chew pattern noted vs rotary chew, but mastication was thorough and did not increase WOB. Pt reported preferring softer foods lately but is still able to chew regular solids. With thin liquids via straw, pt demonstrated timely swallow response. No overt s/s aspiration of regular solids/thin liquids.  GOAL MET.     2- The pt/family will demonstrate understanding of

## 2018-10-25 LAB
Lab: NORMAL
REPORT: NORMAL
THIS TEST SENT TO: NORMAL

## 2018-10-26 LAB
EKG ATRIAL RATE: 87 BPM
EKG DIAGNOSIS: NORMAL
EKG P AXIS: 55 DEGREES
EKG P-R INTERVAL: 158 MS
EKG Q-T INTERVAL: 352 MS
EKG QRS DURATION: 80 MS
EKG QTC CALCULATION (BAZETT): 423 MS
EKG R AXIS: -15 DEGREES
EKG T AXIS: 70 DEGREES
EKG VENTRICULAR RATE: 87 BPM

## 2018-10-27 LAB
FINAL REPORT: NORMAL
PRELIMINARY: NORMAL

## 2018-10-28 LAB
FINAL REPORT: NORMAL
PRELIMINARY: NORMAL

## 2018-10-31 LAB
FINAL REPORT: NORMAL
PRELIMINARY: NORMAL

## 2018-11-13 ENCOUNTER — TELEPHONE (OUTPATIENT)
Dept: FAMILY MEDICINE CLINIC | Age: 73
End: 2018-11-13

## 2018-11-13 NOTE — TELEPHONE ENCOUNTER
Letter completed and will be faxed over to the number provided. Spoke with Dominic Olmedo from [facility].  Patient was in the facility as inpatient and passed away 10/29/2018  Nilay Hurd

## 2018-11-18 LAB
FUNGUS (MYCOLOGY) CULTURE: ABNORMAL
FUNGUS (MYCOLOGY) CULTURE: ABNORMAL
FUNGUS STAIN: ABNORMAL
ORGANISM: ABNORMAL

## 2018-12-04 LAB
AFB CULTURE (MYCOBACTERIA): NORMAL
AFB SMEAR: NORMAL

## 2022-03-28 NOTE — PROGRESS NOTES
Detail Level: Zone let your family touch your surgery site without cleaning their hands. 4. Mild nausea, headache, muscle aches, sore throat, or fatigue may occur after anesthesia. Should any of these symptoms become severe, or should you notice any signs of infection, you should call your surgeon. 5. Narcotic pain medications can cause significant constipation. You may want to add a stool softener to your postoperative medication schedule or speak to your surgeon on how best to manage this SIDE EFFECT. SPECIAL INSTRUCTIONS     Thank you for allowing us to care for you. We strive to exceed your expectations in the delivery of care and service provided to you and your family. If you need to contact us for any reason, please call us at [phone]    Instructions reviewed with patient during preadmission testing phone interview. Lincoln Poole. 10/3/2017 .10:26 AM      ADDITIONAL EDUCATIONAL INFORMATION REVIEWED PER PHONE WITH YOU AND/OR YOUR FAMILY:  No Bring a urine sample on day of surgery  Yes Pain Goal-Taking Control of Your Pain  Yes FAQs about Surgical Site Infections  Yes Hibiclens® Bathing Instructions / or Other Antibacterial Soap  No Incentive Spirometer Education  No Other Render In Strict Bullet Format?: No Plan: Apply protopic to effected areas in the morning. \\nApply beta dip to effected areas on the hands At night time.

## (undated) DEVICE — Z DISCONTINUED USE 2749457 TUBING SAMP AD W12.5XH8.4IN D9.1IN NSL ORAL SMRT CAPNOLINE